# Patient Record
Sex: MALE | Race: WHITE | NOT HISPANIC OR LATINO | Employment: FULL TIME | URBAN - METROPOLITAN AREA
[De-identification: names, ages, dates, MRNs, and addresses within clinical notes are randomized per-mention and may not be internally consistent; named-entity substitution may affect disease eponyms.]

---

## 2017-09-05 DIAGNOSIS — I10 ESSENTIAL HYPERTENSION: ICD-10-CM

## 2017-09-05 RX ORDER — LISINOPRIL 20 MG/1
TABLET ORAL
Qty: 90 TABLET | Refills: 3 | OUTPATIENT
Start: 2017-09-05

## 2017-09-28 ENCOUNTER — OFFICE VISIT (OUTPATIENT)
Dept: FAMILY MEDICINE CLINIC | Facility: CLINIC | Age: 42
End: 2017-09-28

## 2017-09-28 VITALS
DIASTOLIC BLOOD PRESSURE: 80 MMHG | BODY MASS INDEX: 26.68 KG/M2 | HEART RATE: 58 BPM | TEMPERATURE: 97.2 F | RESPIRATION RATE: 16 BRPM | WEIGHT: 197 LBS | HEIGHT: 72 IN | SYSTOLIC BLOOD PRESSURE: 127 MMHG

## 2017-09-28 DIAGNOSIS — Z23 IMMUNIZATION DUE: ICD-10-CM

## 2017-09-28 DIAGNOSIS — I10 ESSENTIAL HYPERTENSION: Primary | ICD-10-CM

## 2017-09-28 DIAGNOSIS — Z13.6 SCREENING FOR ISCHEMIC HEART DISEASE: ICD-10-CM

## 2017-09-28 PROCEDURE — 90471 IMMUNIZATION ADMIN: CPT | Performed by: FAMILY MEDICINE

## 2017-09-28 PROCEDURE — 99213 OFFICE O/P EST LOW 20 MIN: CPT | Performed by: FAMILY MEDICINE

## 2017-09-28 PROCEDURE — 90686 IIV4 VACC NO PRSV 0.5 ML IM: CPT | Performed by: FAMILY MEDICINE

## 2017-09-28 RX ORDER — LISINOPRIL 20 MG/1
20 TABLET ORAL DAILY
Qty: 90 TABLET | Refills: 3 | Status: SHIPPED | OUTPATIENT
Start: 2017-09-28 | End: 2018-10-01 | Stop reason: SDUPTHER

## 2017-09-28 NOTE — PROGRESS NOTES
"Chief Complaint   Patient presents with   • Hypertension       Samm Conte presents to the office today to refill his medications. No medication side effects are reported.  BP is controlled    I have reviewed and updated his medications, medical history and problem list during today's office visit.        Social History   Substance Use Topics   • Smoking status: Never Smoker   • Smokeless tobacco: Never Used   • Alcohol use Yes       Review of Systems   Constitutional: Negative for fatigue.   Cardiovascular: Negative for chest pain.       Objective   /80  Pulse 58  Temp 97.2 °F (36.2 °C) (Oral)   Resp 16  Ht 72\" (182.9 cm)  Wt 197 lb (89.4 kg)  BMI 26.72 kg/m2  Body mass index is 26.72 kg/(m^2).  Physical Exam   Constitutional: He is cooperative. No distress.   Eyes: Conjunctivae and lids are normal.   Neck: Carotid bruit is not present. No tracheal deviation present.   Cardiovascular: Normal rate, regular rhythm and normal heart sounds.    No murmur heard.  Pulmonary/Chest: Effort normal and breath sounds normal.   Neurological: He is alert. He is not disoriented.   Skin: Skin is warm and dry.   Psychiatric: He has a normal mood and affect. His speech is normal and behavior is normal.   Vitals reviewed.      Data Reviewed:        Assessment/Plan     Problem List Items Addressed This Visit        Cardiovascular and Mediastinum    Essential hypertension - Primary    Relevant Medications    lisinopril (PRINIVIL,ZESTRIL) 20 MG tablet    Other Relevant Orders    Comprehensive metabolic panel    CBC and Differential    TSH      Other Visit Diagnoses     Screening for ischemic heart disease        Relevant Orders    Lipid Panel With LDL/HDL Ratio    Immunization due        Relevant Orders    Flu Vaccine Quad PF 3YR+          Outpatient Encounter Prescriptions as of 9/28/2017   Medication Sig Dispense Refill   • lisinopril (PRINIVIL,ZESTRIL) 20 MG tablet Take 1 tablet by mouth Daily. 90 tablet 3   • " [DISCONTINUED] lisinopril (PRINIVIL,ZESTRIL) 20 MG tablet Take 1 tablet by mouth daily. 90 tablet 3     No facility-administered encounter medications on file as of 9/28/2017.        Orders Placed This Encounter   Procedures   • Flu Vaccine Quad PF 3YR+   • Comprehensive metabolic panel     Standing Status:   Future     Standing Expiration Date:   11/2/2018   • Lipid Panel With LDL/HDL Ratio     Standing Status:   Future     Standing Expiration Date:   11/2/2018   • TSH     Standing Status:   Future     Standing Expiration Date:   11/2/2018   • CBC and Differential     Standing Status:   Future     Standing Expiration Date:   11/2/2018     Order Specific Question:   Manual Differential     Answer:   No       Continue with current treatment plan.         F/U in one year

## 2017-12-27 ENCOUNTER — OFFICE VISIT (OUTPATIENT)
Dept: FAMILY MEDICINE CLINIC | Facility: CLINIC | Age: 42
End: 2017-12-27

## 2017-12-27 VITALS
RESPIRATION RATE: 18 BRPM | DIASTOLIC BLOOD PRESSURE: 100 MMHG | OXYGEN SATURATION: 98 % | HEART RATE: 82 BPM | WEIGHT: 206 LBS | BODY MASS INDEX: 27.9 KG/M2 | HEIGHT: 72 IN | SYSTOLIC BLOOD PRESSURE: 144 MMHG | TEMPERATURE: 97.8 F

## 2017-12-27 DIAGNOSIS — J01.91 ACUTE RECURRENT SINUSITIS, UNSPECIFIED LOCATION: Primary | ICD-10-CM

## 2017-12-27 DIAGNOSIS — K21.9 GASTROESOPHAGEAL REFLUX DISEASE, ESOPHAGITIS PRESENCE NOT SPECIFIED: ICD-10-CM

## 2017-12-27 PROCEDURE — 99214 OFFICE O/P EST MOD 30 MIN: CPT | Performed by: NURSE PRACTITIONER

## 2017-12-27 RX ORDER — CEFDINIR 300 MG/1
600 CAPSULE ORAL DAILY
Qty: 20 CAPSULE | Refills: 0 | Status: SHIPPED | OUTPATIENT
Start: 2017-12-27 | End: 2018-10-01

## 2017-12-27 RX ORDER — FLUTICASONE PROPIONATE 50 MCG
2 SPRAY, SUSPENSION (ML) NASAL DAILY
Qty: 1 BOTTLE | Refills: 11 | Status: SHIPPED | OUTPATIENT
Start: 2017-12-27 | End: 2018-10-01

## 2017-12-27 RX ORDER — PREDNISONE 20 MG/1
20 TABLET ORAL 2 TIMES DAILY
Qty: 10 TABLET | Refills: 0 | Status: SHIPPED | OUTPATIENT
Start: 2017-12-27 | End: 2018-10-01

## 2017-12-27 RX ORDER — OMEPRAZOLE 40 MG/1
40 CAPSULE, DELAYED RELEASE ORAL DAILY
COMMUNITY
End: 2017-12-27 | Stop reason: SDUPTHER

## 2017-12-27 RX ORDER — OMEPRAZOLE 40 MG/1
40 CAPSULE, DELAYED RELEASE ORAL DAILY
Qty: 30 CAPSULE | Refills: 11 | Status: SHIPPED | OUTPATIENT
Start: 2017-12-27 | End: 2018-10-01 | Stop reason: SDUPTHER

## 2017-12-27 NOTE — PROGRESS NOTES
Subjective   Dg Harden is a 42 y.o. male.     History of Present Illness   Dg Harden 42 y.o. male who presents for evaluation of sinus pressure and congestion.  States symptoms originally started a couple of months ago and have been intermittent since.  He has had increased purulent nasal discharge and sinus pressure. He is also having increased post nasal drainage and sore throat within the past week. Seems to be worse after he travels. Just returned from [place]. Symptoms include ear pressure, congestion, sore throat, nasal blockage and post nasal drip.  Onset of symptoms was several days ago, unchanged since that time. Patient denies fever.   Evaluation to date: none   Treatment to date:  OTC oral decongestants and Advil.       Patient also reports some intermittent left chest pain that he notices mostly at night.  He has seen cardiology and had negative workup. He denies cough, shortness of breath of wheezing. He does have reflux and has had some increased symptoms recently. He takes OTC prilosec.   The following portions of the patient's history were reviewed and updated as appropriate: allergies, current medications, past family history, past medical history, past social history, past surgical history and problem list.    Review of Systems   Constitutional: Negative for chills and fever.   HENT: Positive for congestion, postnasal drip, sinus pain, sinus pressure and sore throat.    Respiratory: Negative for cough, chest tightness, shortness of breath and wheezing.        Objective   Physical Exam   Constitutional: He is oriented to person, place, and time. He appears well-developed and well-nourished.   HENT:   Right Ear: Tympanic membrane, external ear and ear canal normal.   Left Ear: Tympanic membrane, external ear and ear canal normal.   Nose: Right sinus exhibits no maxillary sinus tenderness and no frontal sinus tenderness. Left sinus exhibits no maxillary sinus tenderness and no frontal sinus  tenderness.   Mouth/Throat: Uvula is midline and oropharynx is clear and moist.   Cardiovascular: Normal rate and regular rhythm.    Pulmonary/Chest: Effort normal and breath sounds normal.   Neurological: He is oriented to person, place, and time.   Skin: Skin is warm and dry.   Psychiatric: He has a normal mood and affect. His behavior is normal. Judgment and thought content normal.   Nursing note and vitals reviewed.      Assessment/Plan   Dg was seen today for uri.    Diagnoses and all orders for this visit:    Acute recurrent sinusitis, unspecified location  -     fluticasone (FLONASE) 50 MCG/ACT nasal spray; 2 sprays into each nostril Daily.  -     cefdinir (OMNICEF) 300 MG capsule; Take 2 capsules by mouth Daily. For infection  -     predniSONE (DELTASONE) 20 MG tablet; Take 1 tablet by mouth 2 (Two) Times a Day.    Gastroesophageal reflux disease, esophagitis presence not specified  -     omeprazole (priLOSEC) 40 MG capsule; Take 1 capsule by mouth Daily.          Will increase prilosec dose to see if helps reflux. Patient to avoid eating late and elevated HOB.

## 2018-08-24 ENCOUNTER — RESULTS ENCOUNTER (OUTPATIENT)
Dept: FAMILY MEDICINE CLINIC | Facility: CLINIC | Age: 43
End: 2018-08-24

## 2018-08-24 DIAGNOSIS — I10 ESSENTIAL HYPERTENSION: ICD-10-CM

## 2018-08-24 DIAGNOSIS — Z13.6 SCREENING FOR ISCHEMIC HEART DISEASE: ICD-10-CM

## 2018-09-12 DIAGNOSIS — I10 ESSENTIAL HYPERTENSION: ICD-10-CM

## 2018-09-12 RX ORDER — LISINOPRIL 20 MG/1
TABLET ORAL
Qty: 90 TABLET | Refills: 3 | OUTPATIENT
Start: 2018-09-12

## 2018-10-01 ENCOUNTER — OFFICE VISIT (OUTPATIENT)
Dept: FAMILY MEDICINE CLINIC | Facility: CLINIC | Age: 43
End: 2018-10-01

## 2018-10-01 VITALS
RESPIRATION RATE: 16 BRPM | DIASTOLIC BLOOD PRESSURE: 86 MMHG | TEMPERATURE: 98.2 F | WEIGHT: 207 LBS | BODY MASS INDEX: 28.04 KG/M2 | HEIGHT: 72 IN | HEART RATE: 77 BPM | SYSTOLIC BLOOD PRESSURE: 127 MMHG

## 2018-10-01 DIAGNOSIS — I10 ESSENTIAL HYPERTENSION: Primary | ICD-10-CM

## 2018-10-01 DIAGNOSIS — K21.9 GASTROESOPHAGEAL REFLUX DISEASE WITHOUT ESOPHAGITIS: ICD-10-CM

## 2018-10-01 PROCEDURE — 99213 OFFICE O/P EST LOW 20 MIN: CPT | Performed by: FAMILY MEDICINE

## 2018-10-01 RX ORDER — OMEPRAZOLE 40 MG/1
40 CAPSULE, DELAYED RELEASE ORAL DAILY
Qty: 90 CAPSULE | Refills: 3 | Status: SHIPPED | OUTPATIENT
Start: 2018-10-01 | End: 2019-11-20 | Stop reason: SDUPTHER

## 2018-10-01 RX ORDER — LISINOPRIL 20 MG/1
20 TABLET ORAL DAILY
Qty: 90 TABLET | Refills: 3 | Status: SHIPPED | OUTPATIENT
Start: 2018-10-01 | End: 2019-09-15 | Stop reason: SDUPTHER

## 2018-10-01 NOTE — PROGRESS NOTES
"Chief Complaint   Patient presents with   • Hypertension       Subjective     Dg Harden presents to the office today to refill his medications. No medication side effects are reported.  This patient presents the office to refill medicines.  Blood pressure is controlled.  GERD symptoms are controlled.  Labs are due.  Overall, he feels well.    I have reviewed and updated his medications, medical history and problem list during today's office visit.     Patient Care Team:  Khoi Man MD as PCP - General (Family Medicine)    Social History   Substance Use Topics   • Smoking status: Never Smoker   • Smokeless tobacco: Never Used   • Alcohol use Yes     5 Cans of beer per week       Review of Systems   Constitutional: Negative for fatigue.   Cardiovascular: Negative for chest pain.       Objective     /86   Pulse 77   Temp 98.2 °F (36.8 °C) (Oral)   Resp 16   Ht 182.9 cm (72\")   Wt 93.9 kg (207 lb)   BMI 28.07 kg/m²     Body mass index is 28.07 kg/m².    Physical Exam   Constitutional: He is oriented to person, place, and time. He appears well-developed. No distress.   Eyes: Conjunctivae and lids are normal.   Neck: Carotid bruit is not present.   Cardiovascular: Normal rate, regular rhythm and normal heart sounds.    Pulmonary/Chest: Effort normal and breath sounds normal.   Neurological: He is alert and oriented to person, place, and time.   Skin: Skin is warm and dry.   Psychiatric: He has a normal mood and affect. His behavior is normal.   Vitals reviewed.      Data Reviewed:             Assessment/Plan     Problem List Items Addressed This Visit     Essential hypertension - Primary     Hypertension is unchanged.  Continue current treatment regimen.  Blood pressure will be reassessed at the next regular appointment.         Relevant Medications    lisinopril (PRINIVIL,ZESTRIL) 20 MG tablet    Gastroesophageal reflux disease without esophagitis     The current medical regimen is effective;  " continue present plan and medications.           Relevant Medications    omeprazole (priLOSEC) 40 MG capsule          No orders of the defined types were placed in this encounter.        Current Outpatient Prescriptions:   •  omeprazole (priLOSEC) 40 MG capsule, Take 1 capsule by mouth Daily., Disp: 90 capsule, Rfl: 3  •  lisinopril (PRINIVIL,ZESTRIL) 20 MG tablet, Take 1 tablet by mouth Daily., Disp: 90 tablet, Rfl: 3    Return in about 1 year (around 10/1/2019) for Recheck.

## 2019-09-15 DIAGNOSIS — I10 ESSENTIAL HYPERTENSION: ICD-10-CM

## 2019-09-16 RX ORDER — LISINOPRIL 20 MG/1
TABLET ORAL
Qty: 90 TABLET | Refills: 0 | Status: SHIPPED | OUTPATIENT
Start: 2019-09-16 | End: 2020-01-08 | Stop reason: SDUPTHER

## 2019-11-20 DIAGNOSIS — K21.9 GASTROESOPHAGEAL REFLUX DISEASE WITHOUT ESOPHAGITIS: ICD-10-CM

## 2019-11-20 RX ORDER — OMEPRAZOLE 40 MG/1
CAPSULE, DELAYED RELEASE ORAL
Qty: 30 CAPSULE | Refills: 0 | Status: SHIPPED | OUTPATIENT
Start: 2019-11-20 | End: 2020-01-08 | Stop reason: SDUPTHER

## 2019-12-14 DIAGNOSIS — I10 ESSENTIAL HYPERTENSION: ICD-10-CM

## 2019-12-16 RX ORDER — LISINOPRIL 20 MG/1
TABLET ORAL
Qty: 90 TABLET | Refills: 0 | OUTPATIENT
Start: 2019-12-16

## 2019-12-20 DIAGNOSIS — K21.9 GASTROESOPHAGEAL REFLUX DISEASE WITHOUT ESOPHAGITIS: ICD-10-CM

## 2019-12-20 RX ORDER — OMEPRAZOLE 40 MG/1
CAPSULE, DELAYED RELEASE ORAL
Qty: 30 CAPSULE | Refills: 0 | OUTPATIENT
Start: 2019-12-20

## 2020-01-08 ENCOUNTER — OFFICE VISIT (OUTPATIENT)
Dept: FAMILY MEDICINE CLINIC | Facility: CLINIC | Age: 45
End: 2020-01-08

## 2020-01-08 VITALS
HEIGHT: 74 IN | BODY MASS INDEX: 26.56 KG/M2 | SYSTOLIC BLOOD PRESSURE: 124 MMHG | OXYGEN SATURATION: 98 % | WEIGHT: 207 LBS | RESPIRATION RATE: 16 BRPM | DIASTOLIC BLOOD PRESSURE: 85 MMHG | TEMPERATURE: 98.4 F | HEART RATE: 73 BPM

## 2020-01-08 DIAGNOSIS — I10 ESSENTIAL HYPERTENSION: Primary | ICD-10-CM

## 2020-01-08 DIAGNOSIS — E66.3 OVERWEIGHT (BMI 25.0-29.9): ICD-10-CM

## 2020-01-08 DIAGNOSIS — K21.9 GASTROESOPHAGEAL REFLUX DISEASE WITHOUT ESOPHAGITIS: ICD-10-CM

## 2020-01-08 PROCEDURE — 99214 OFFICE O/P EST MOD 30 MIN: CPT | Performed by: FAMILY MEDICINE

## 2020-01-08 RX ORDER — OMEPRAZOLE 40 MG/1
40 CAPSULE, DELAYED RELEASE ORAL DAILY
Qty: 90 CAPSULE | Refills: 3 | Status: SHIPPED | OUTPATIENT
Start: 2020-01-08 | End: 2021-02-01 | Stop reason: SDUPTHER

## 2020-01-08 RX ORDER — LISINOPRIL 20 MG/1
20 TABLET ORAL DAILY
Qty: 90 TABLET | Refills: 3 | Status: SHIPPED | OUTPATIENT
Start: 2020-01-08 | End: 2021-02-01 | Stop reason: SDUPTHER

## 2020-01-08 NOTE — PROGRESS NOTES
"   Subjective       Chief Complaint   Patient presents with   • Hypertension     med refill    • Insomnia         History of Present Illness   Dg Harden is a 44 y.o. male who presents to the office today to refill medicines.  Labs are due.  Blood pressure is controlled.  GERD symptoms are stable on current therapy.  No side effects are reported.  He has had mild interval weight gain since last visit.  Overall he feels well. Sleep is an issue.    I have reviewed and updated his medications, medical history and problem list during today's office visit.     Social History     Tobacco Use   • Smoking status: Never Smoker   • Smokeless tobacco: Never Used   Substance Use Topics   • Alcohol use: Yes     Alcohol/week: 5.0 standard drinks     Types: 5 Cans of beer per week       Review of Systems   Constitutional: Negative for fatigue.   Cardiovascular: Negative for chest pain.         Physical Examination:   Objective   /85   Pulse 73   Temp 98.4 °F (36.9 °C) (Oral)   Resp 16   Ht 188 cm (74\")   Wt 93.9 kg (207 lb)   SpO2 98%   BMI 26.58 kg/m²     Body mass index is 26.58 kg/m².    Physical Exam   Constitutional: He is oriented to person, place, and time. He appears well-developed. No distress.   Eyes: Conjunctivae and lids are normal.   Neck: Carotid bruit is not present.   Cardiovascular: Normal rate, regular rhythm and normal heart sounds.   Pulmonary/Chest: Effort normal and breath sounds normal.   Neurological: He is alert and oriented to person, place, and time.   Skin: Skin is warm and dry.   Psychiatric: He has a normal mood and affect. His behavior is normal.   Vitals reviewed.       Data Reviewed:                      Assessment/Plan:   Assessment/Plan   Diagnoses and all orders for this visit:    1. Essential hypertension (Primary)  Assessment & Plan:  Hypertension is unchanged.  Continue current treatment regimen.  Blood pressure will be reassessed at the next regular " appointment.    Orders:  -     lisinopril (PRINIVIL,ZESTRIL) 20 MG tablet; Take 1 tablet by mouth Daily.  Dispense: 90 tablet; Refill: 3    2. Gastroesophageal reflux disease without esophagitis  Assessment & Plan:  The current medical regimen is effective;  continue present plan and medications.      Orders:  -     omeprazole (priLOSEC) 40 MG capsule; Take 1 capsule by mouth Daily.  Dispense: 90 capsule; Refill: 3    3. Overweight (BMI 25.0-29.9)  Assessment & Plan:  Obesity is worsening.  Discussed the patient's BMI.  The BMI is above average; BMI management plan is completed.  Diet interventions: moderate (500 kCal/d) deficit diet.      Patient Instructions         Follow up:   Return in about 1 year (around 1/8/2021) for Adult Wellness Visit, 30 minute visit, Recheck.

## 2020-12-20 DIAGNOSIS — I10 ESSENTIAL HYPERTENSION: ICD-10-CM

## 2020-12-20 DIAGNOSIS — K21.9 GASTROESOPHAGEAL REFLUX DISEASE WITHOUT ESOPHAGITIS: ICD-10-CM

## 2020-12-21 RX ORDER — OMEPRAZOLE 40 MG/1
CAPSULE, DELAYED RELEASE ORAL
Qty: 90 CAPSULE | Refills: 3 | OUTPATIENT
Start: 2020-12-21

## 2020-12-21 RX ORDER — LISINOPRIL 20 MG/1
TABLET ORAL
Qty: 90 TABLET | Refills: 3 | OUTPATIENT
Start: 2020-12-21

## 2021-01-21 ENCOUNTER — APPOINTMENT (OUTPATIENT)
Dept: CT IMAGING | Facility: HOSPITAL | Age: 46
End: 2021-01-21

## 2021-01-21 ENCOUNTER — HOSPITAL ENCOUNTER (OUTPATIENT)
Facility: HOSPITAL | Age: 46
Setting detail: OBSERVATION
Discharge: HOME | End: 2021-01-22
Attending: EMERGENCY MEDICINE | Admitting: HOSPITALIST

## 2021-01-21 DIAGNOSIS — K52.9 ENTERITIS: Primary | ICD-10-CM

## 2021-01-21 DIAGNOSIS — R19.5 HEME POSITIVE STOOL: ICD-10-CM

## 2021-01-21 LAB
ADV 40+41 DNA STL QL NAA+NON-PROBE: NOT DETECTED
ALBUMIN SERPL-MCNC: 4.7 G/DL (ref 3.5–5.2)
ALBUMIN/GLOB SERPL: 1.8 G/DL
ALP SERPL-CCNC: 60 U/L (ref 39–117)
ALT SERPL W P-5'-P-CCNC: 35 U/L (ref 1–41)
ANION GAP SERPL CALCULATED.3IONS-SCNC: 12 MMOL/L (ref 5–15)
APTT PPP: 24.4 SECONDS (ref 22.7–35.4)
AST SERPL-CCNC: 31 U/L (ref 1–40)
ASTRO TYP 1-8 RNA STL QL NAA+NON-PROBE: NOT DETECTED
BASOPHILS # BLD AUTO: 0.02 10*3/MM3 (ref 0–0.2)
BASOPHILS NFR BLD AUTO: 0.2 % (ref 0–1.5)
BILIRUB SERPL-MCNC: 0.5 MG/DL (ref 0–1.2)
BUN SERPL-MCNC: 15 MG/DL (ref 6–20)
BUN/CREAT SERPL: 13.5 (ref 7–25)
C CAYETANENSIS DNA STL QL NAA+NON-PROBE: NOT DETECTED
CALCIUM SPEC-SCNC: 9.5 MG/DL (ref 8.6–10.5)
CAMPY SP DNA.DIARRHEA STL QL NAA+PROBE: NOT DETECTED
CHLORIDE SERPL-SCNC: 101 MMOL/L (ref 98–107)
CO2 SERPL-SCNC: 25 MMOL/L (ref 22–29)
CREAT SERPL-MCNC: 1.11 MG/DL (ref 0.76–1.27)
CRYPTOSP STL CULT: NOT DETECTED
DEPRECATED RDW RBC AUTO: 39.2 FL (ref 37–54)
E HISTOLYT AG STL-ACNC: NOT DETECTED
EAEC PAA PLAS AGGR+AATA ST NAA+NON-PRB: NOT DETECTED
EC STX1 + STX2 GENES STL NAA+PROBE: NOT DETECTED
EOSINOPHIL # BLD AUTO: 0.07 10*3/MM3 (ref 0–0.4)
EOSINOPHIL NFR BLD AUTO: 0.8 % (ref 0.3–6.2)
EPEC EAE GENE STL QL NAA+NON-PROBE: NOT DETECTED
ERYTHROCYTE [DISTWIDTH] IN BLOOD BY AUTOMATED COUNT: 12.9 % (ref 12.3–15.4)
ETEC LTA+ST1A+ST1B TOX ST NAA+NON-PROBE: NOT DETECTED
G LAMBLIA DNA SPEC QL NAA+PROBE: NOT DETECTED
GFR SERPL CREATININE-BSD FRML MDRD: 71 ML/MIN/1.73
GLOBULIN UR ELPH-MCNC: 2.6 GM/DL
GLUCOSE SERPL-MCNC: 106 MG/DL (ref 65–99)
HCT VFR BLD AUTO: 48.3 % (ref 37.5–51)
HGB BLD-MCNC: 15.8 G/DL (ref 13–17.7)
HOLD SPECIMEN: NORMAL
HOLD SPECIMEN: NORMAL
IMM GRANULOCYTES # BLD AUTO: 0.03 10*3/MM3 (ref 0–0.05)
IMM GRANULOCYTES NFR BLD AUTO: 0.3 % (ref 0–0.5)
INR PPP: 1.03 (ref 0.9–1.1)
LIPASE SERPL-CCNC: 25 U/L (ref 13–60)
LYMPHOCYTES # BLD AUTO: 1.3 10*3/MM3 (ref 0.7–3.1)
LYMPHOCYTES NFR BLD AUTO: 14.6 % (ref 19.6–45.3)
MCH RBC QN AUTO: 27.6 PG (ref 26.6–33)
MCHC RBC AUTO-ENTMCNC: 32.7 G/DL (ref 31.5–35.7)
MCV RBC AUTO: 84.4 FL (ref 79–97)
MONOCYTES # BLD AUTO: 0.57 10*3/MM3 (ref 0.1–0.9)
MONOCYTES NFR BLD AUTO: 6.4 % (ref 5–12)
NEUTROPHILS NFR BLD AUTO: 6.93 10*3/MM3 (ref 1.7–7)
NEUTROPHILS NFR BLD AUTO: 77.7 % (ref 42.7–76)
NOROVIRUS GI+II RNA STL QL NAA+NON-PROBE: NOT DETECTED
NRBC BLD AUTO-RTO: 0 /100 WBC (ref 0–0.2)
P SHIGELLOIDES DNA STL QL NAA+PROBE: NOT DETECTED
PLATELET # BLD AUTO: 273 10*3/MM3 (ref 140–450)
PMV BLD AUTO: 9.5 FL (ref 6–12)
POTASSIUM SERPL-SCNC: 3.8 MMOL/L (ref 3.5–5.2)
PROT SERPL-MCNC: 7.3 G/DL (ref 6–8.5)
PROTHROMBIN TIME: 13.3 SECONDS (ref 11.7–14.2)
RBC # BLD AUTO: 5.72 10*6/MM3 (ref 4.14–5.8)
RV RNA STL NAA+PROBE: NOT DETECTED
SALMONELLA DNA SPEC QL NAA+PROBE: NOT DETECTED
SAPO I+II+IV+V RNA STL QL NAA+NON-PROBE: NOT DETECTED
SARS-COV-2 RNA RESP QL NAA+PROBE: NOT DETECTED
SHIGELLA SP+EIEC IPAH STL QL NAA+PROBE: NOT DETECTED
SODIUM SERPL-SCNC: 138 MMOL/L (ref 136–145)
V CHOLERAE DNA SPEC QL NAA+PROBE: NOT DETECTED
VIBRIO DNA SPEC NAA+PROBE: NOT DETECTED
WBC # BLD AUTO: 8.92 10*3/MM3 (ref 3.4–10.8)
WHOLE BLOOD HOLD SPECIMEN: NORMAL
YERSINIA STL CULT: NOT DETECTED

## 2021-01-21 PROCEDURE — G0378 HOSPITAL OBSERVATION PER HR: HCPCS

## 2021-01-21 PROCEDURE — 96376 TX/PRO/DX INJ SAME DRUG ADON: CPT

## 2021-01-21 PROCEDURE — 85025 COMPLETE CBC W/AUTO DIFF WBC: CPT | Performed by: EMERGENCY MEDICINE

## 2021-01-21 PROCEDURE — U0003 INFECTIOUS AGENT DETECTION BY NUCLEIC ACID (DNA OR RNA); SEVERE ACUTE RESPIRATORY SYNDROME CORONAVIRUS 2 (SARS-COV-2) (CORONAVIRUS DISEASE [COVID-19]), AMPLIFIED PROBE TECHNIQUE, MAKING USE OF HIGH THROUGHPUT TECHNOLOGIES AS DESCRIBED BY CMS-2020-01-R: HCPCS | Performed by: EMERGENCY MEDICINE

## 2021-01-21 PROCEDURE — 96361 HYDRATE IV INFUSION ADD-ON: CPT

## 2021-01-21 PROCEDURE — 96374 THER/PROPH/DIAG INJ IV PUSH: CPT

## 2021-01-21 PROCEDURE — 85018 HEMOGLOBIN: CPT | Performed by: NURSE PRACTITIONER

## 2021-01-21 PROCEDURE — 83690 ASSAY OF LIPASE: CPT | Performed by: EMERGENCY MEDICINE

## 2021-01-21 PROCEDURE — 85610 PROTHROMBIN TIME: CPT | Performed by: EMERGENCY MEDICINE

## 2021-01-21 PROCEDURE — 99284 EMERGENCY DEPT VISIT MOD MDM: CPT

## 2021-01-21 PROCEDURE — 74177 CT ABD & PELVIS W/CONTRAST: CPT

## 2021-01-21 PROCEDURE — 85730 THROMBOPLASTIN TIME PARTIAL: CPT | Performed by: EMERGENCY MEDICINE

## 2021-01-21 PROCEDURE — C9803 HOPD COVID-19 SPEC COLLECT: HCPCS

## 2021-01-21 PROCEDURE — 80053 COMPREHEN METABOLIC PANEL: CPT | Performed by: EMERGENCY MEDICINE

## 2021-01-21 PROCEDURE — 85014 HEMATOCRIT: CPT | Performed by: NURSE PRACTITIONER

## 2021-01-21 PROCEDURE — 0097U HC BIOFIRE FILMARRAY GI PANEL: CPT | Performed by: EMERGENCY MEDICINE

## 2021-01-21 PROCEDURE — 25010000002 IOPAMIDOL 61 % SOLUTION: Performed by: EMERGENCY MEDICINE

## 2021-01-21 PROCEDURE — 36415 COLL VENOUS BLD VENIPUNCTURE: CPT

## 2021-01-21 RX ORDER — SODIUM CHLORIDE 0.9 % (FLUSH) 0.9 %
10 SYRINGE (ML) INJECTION AS NEEDED
Status: DISCONTINUED | OUTPATIENT
Start: 2021-01-21 | End: 2021-01-22 | Stop reason: HOSPADM

## 2021-01-21 RX ORDER — PANTOPRAZOLE SODIUM 40 MG/10ML
40 INJECTION, POWDER, LYOPHILIZED, FOR SOLUTION INTRAVENOUS
Status: DISCONTINUED | OUTPATIENT
Start: 2021-01-21 | End: 2021-01-22 | Stop reason: HOSPADM

## 2021-01-21 RX ORDER — LISINOPRIL 20 MG/1
20 TABLET ORAL DAILY
Status: DISCONTINUED | OUTPATIENT
Start: 2021-01-21 | End: 2021-01-22 | Stop reason: HOSPADM

## 2021-01-21 RX ORDER — ACETAMINOPHEN 325 MG/1
650 TABLET ORAL EVERY 4 HOURS PRN
Status: DISCONTINUED | OUTPATIENT
Start: 2021-01-21 | End: 2021-01-22 | Stop reason: HOSPADM

## 2021-01-21 RX ORDER — SODIUM CHLORIDE 0.9 % (FLUSH) 0.9 %
10 SYRINGE (ML) INJECTION EVERY 12 HOURS SCHEDULED
Status: DISCONTINUED | OUTPATIENT
Start: 2021-01-21 | End: 2021-01-22 | Stop reason: HOSPADM

## 2021-01-21 RX ORDER — ONDANSETRON 2 MG/ML
4 INJECTION INTRAMUSCULAR; INTRAVENOUS EVERY 6 HOURS PRN
Status: DISCONTINUED | OUTPATIENT
Start: 2021-01-21 | End: 2021-01-22 | Stop reason: HOSPADM

## 2021-01-21 RX ORDER — ACETAMINOPHEN 650 MG/1
650 SUPPOSITORY RECTAL EVERY 4 HOURS PRN
Status: DISCONTINUED | OUTPATIENT
Start: 2021-01-21 | End: 2021-01-21

## 2021-01-21 RX ORDER — SODIUM CHLORIDE 9 MG/ML
125 INJECTION, SOLUTION INTRAVENOUS CONTINUOUS
Status: DISCONTINUED | OUTPATIENT
Start: 2021-01-21 | End: 2021-01-22 | Stop reason: HOSPADM

## 2021-01-21 RX ORDER — PANTOPRAZOLE SODIUM 40 MG/10ML
80 INJECTION, POWDER, LYOPHILIZED, FOR SOLUTION INTRAVENOUS ONCE
Status: COMPLETED | OUTPATIENT
Start: 2021-01-21 | End: 2021-01-21

## 2021-01-21 RX ORDER — ACETAMINOPHEN 160 MG/5ML
650 SOLUTION ORAL EVERY 4 HOURS PRN
Status: DISCONTINUED | OUTPATIENT
Start: 2021-01-21 | End: 2021-01-21

## 2021-01-21 RX ORDER — SODIUM CHLORIDE 0.9 % (FLUSH) 0.9 %
10 SYRINGE (ML) INJECTION AS NEEDED
Status: DISCONTINUED | OUTPATIENT
Start: 2021-01-21 | End: 2021-01-21

## 2021-01-21 RX ADMIN — SODIUM CHLORIDE 1000 ML: 9 INJECTION, SOLUTION INTRAVENOUS at 10:00

## 2021-01-21 RX ADMIN — SODIUM CHLORIDE 125 ML/HR: 9 INJECTION, SOLUTION INTRAVENOUS at 21:25

## 2021-01-21 RX ADMIN — PANTOPRAZOLE SODIUM 40 MG: 40 INJECTION, POWDER, FOR SOLUTION INTRAVENOUS at 16:35

## 2021-01-21 RX ADMIN — SODIUM CHLORIDE, PRESERVATIVE FREE 10 ML: 5 INJECTION INTRAVENOUS at 21:25

## 2021-01-21 RX ADMIN — SODIUM CHLORIDE 125 ML/HR: 9 INJECTION, SOLUTION INTRAVENOUS at 14:31

## 2021-01-21 RX ADMIN — IOPAMIDOL 85 ML: 612 INJECTION, SOLUTION INTRAVENOUS at 11:27

## 2021-01-21 RX ADMIN — PANTOPRAZOLE SODIUM 80 MG: 40 INJECTION, POWDER, FOR SOLUTION INTRAVENOUS at 10:07

## 2021-01-21 NOTE — PLAN OF CARE
Goal Outcome Evaluation:  Plan of Care Reviewed With: patient  Progress: no change  Outcome Summary: New admit from ER today. Full liquid diet but can advance as tolerated. IVF infusing. Stool sent to lab for testing. No c/o n/v/p. Ad bryson. VSS. Will continue to monitor.

## 2021-01-21 NOTE — H&P
Patient Name:  Dg Harden  YOB: 1975  MRN:  [id number]  Admit Date:  1/21/2021  Patient Care Team:  Khoi Man MD as PCP - General (Family Medicine)      Subjective   History Present Illness     Chief Complaint   Patient presents with   • Black or Bloody Stool   • Abdominal Pain       Mr. Harden is a 45 y.o. {with a history of GERD and HTN that presents to [facility] complaining of abdominal pain, diarrhea, and nausea onset Tuesday.  He describes his stool as watery and black but no obvious blood.  He did take Pepto-Bismol yesterday without relief but he was having black stools prior to taking the medication.  He still has periumbilical abdominal pain described as moderate.  He denies nausea or vomiting presently.  He is having a loose stool approximately every 2-3 hours.  His appetite and intake has been adequate.  No known exacerbating or alleviating factors.  He flew to [facility] last week for work and was exposed to people not wearing a mask.  He denies fever, chills, cough, congestion, shortness of breath.  He denies known exposure to contaminated food or water, antibiotics in the last 3 months, or known ill individuals.  He has a history of GERD well-controlled on PPI with no prior history of GI bleeding or peptic ulcer disease.  He does not drink regularly or use NSAIDs/OACs.  No prior upper or lower endoscopy.         History of Present Illness  Review of Systems   Constitutional: Negative for appetite change, chills, diaphoresis, fatigue and fever.   HENT: Negative for trouble swallowing.    Eyes: Negative for visual disturbance.   Respiratory: Negative for cough, choking, chest tightness, shortness of breath and stridor.    Cardiovascular: Negative for chest pain, palpitations and leg swelling.   Gastrointestinal: Positive for abdominal distention, abdominal pain, diarrhea and nausea. Negative for anal bleeding, blood in stool, constipation and vomiting.         No hematemesis/ coffee-ground emesis, no hematochezia   Endocrine: Negative for polydipsia, polyphagia and polyuria.   Genitourinary: Negative for dysuria.   Musculoskeletal: Negative for back pain.   Skin: Negative for pallor.   Neurological: Negative for dizziness, weakness and light-headedness.   Hematological: Does not bruise/bleed easily.   Psychiatric/Behavioral: Negative for confusion.        Personal History     Past Medical History:   Diagnosis Date   • Hyperlipidemia    • Hypertension      Past Surgical History:   Procedure Laterality Date   • SEPTOPLASTY  Feb 2009   • VASECTOMY       Family History   Problem Relation Age of Onset   • Hypertension Father    • Hyperlipidemia Father    • Stroke Maternal Grandmother    • Heart disease Paternal Grandfather      Social History     Tobacco Use   • Smoking status: Never Smoker   • Smokeless tobacco: Never Used   Substance Use Topics   • Alcohol use: Yes     Alcohol/week: 5.0 standard drinks     Types: 5 Cans of beer per week   • Drug use: No     No current facility-administered medications on file prior to encounter.      Current Outpatient Medications on File Prior to Encounter   Medication Sig Dispense Refill   • lisinopril (PRINIVIL,ZESTRIL) 20 MG tablet Take 1 tablet by mouth Daily. 90 tablet 3   • omeprazole (priLOSEC) 40 MG capsule Take 1 capsule by mouth Daily. 90 capsule 3     No Known Allergies    Objective    Objective     Vital Signs  Temp:  [97 °F (36.1 °C)] 97 °F (36.1 °C)  Heart Rate:  [] 84  Resp:  [18] 18  BP: ()/(60-77) 105/77  SpO2:  [98 %-99 %] 98 %  on   ;   Device (Oxygen Therapy): room air  Body mass index is 27.12 kg/m².    Physical Exam  Vitals signs and nursing note reviewed.   Constitutional:       General: He is not in acute distress.     Appearance: He is well-developed. He is not toxic-appearing or diaphoretic.      Comments: Mild ill appearance   HENT:      Head: Normocephalic and atraumatic.      Mouth/Throat:       Mouth: Mucous membranes are moist.   Eyes:      Extraocular Movements: Extraocular movements intact.      Conjunctiva/sclera: Conjunctivae normal.   Neck:      Musculoskeletal: Normal range of motion.      Trachea: No tracheal deviation.   Cardiovascular:      Rate and Rhythm: Normal rate and regular rhythm.      Pulses: Normal pulses.      Heart sounds: No murmur.   Pulmonary:      Effort: Pulmonary effort is normal. No respiratory distress.      Breath sounds: Normal breath sounds. No wheezing, rhonchi or rales.   Abdominal:      General: Bowel sounds are increased. There is no distension.      Palpations: Abdomen is soft. There is no mass.      Tenderness: There is abdominal tenderness in the epigastric area and periumbilical area. There is no guarding or rebound.      Comments: Mild abdominal distention   Musculoskeletal: Normal range of motion.   Skin:     General: Skin is warm and dry.   Neurological:      General: No focal deficit present.      Mental Status: He is alert and oriented to person, place, and time.   Psychiatric:         Judgment: Judgment normal.         Results Review:  I reviewed the patient's new clinical results.  I reviewed the patient's new imaging results and agree with the interpretation.  I reviewed the patient's other test results and agree with the interpretation  I personally viewed and interpreted the patient's EKG/Telemetry data  Discussed with ED provider.    Lab Results (last 24 hours)     Procedure Component Value Units Date/Time    POCT FECAL OCCULT BLOOD BY IMMUNOASSAY [[id number]]  (Abnormal) Collected: 01/21/21 0913    Specimen: Stool Updated: 01/21/21 0939     Fecal Occult Blood Positive     QC Yes     Lot Number [id number]     Comment: 08/28/21       CBC & Differential [[id number]]  (Abnormal) Collected: 01/21/21 0949    Specimen: Blood Updated: 01/21/21 1009    Narrative:      The following orders were created for panel order CBC & Differential.  Procedure                                Abnormality         Status                     ---------                               -----------         ------                     CBC Auto Differential[[id number]]        Abnormal            Final result                 Please view results for these tests on the individual orders.    Comprehensive Metabolic Panel [[id number]]  (Abnormal) Collected: 01/21/21 0949    Specimen: Blood Updated: 01/21/21 1028     Glucose 106 mg/dL      BUN 15 mg/dL      Creatinine 1.11 mg/dL      Sodium 138 mmol/L      Potassium 3.8 mmol/L      Chloride 101 mmol/L      CO2 25.0 mmol/L      Calcium 9.5 mg/dL      Total Protein 7.3 g/dL      Albumin 4.70 g/dL      ALT (SGPT) 35 U/L      AST (SGOT) 31 U/L      Alkaline Phosphatase 60 U/L      Total Bilirubin 0.5 mg/dL      eGFR Non African Amer 71 mL/min/1.73      Globulin 2.6 gm/dL      A/G Ratio 1.8 g/dL      BUN/Creatinine Ratio 13.5     Anion Gap 12.0 mmol/L     Narrative:      GFR Normal >60  Chronic Kidney Disease <60  Kidney Failure <15      CBC Auto Differential [[id number]]  (Abnormal) Collected: 01/21/21 0949    Specimen: Blood Updated: 01/21/21 1009     WBC 8.92 10*3/mm3      RBC 5.72 10*6/mm3      Hemoglobin 15.8 g/dL      Hematocrit 48.3 %      MCV 84.4 fL      MCH 27.6 pg      MCHC 32.7 g/dL      RDW 12.9 %      RDW-SD 39.2 fl      MPV 9.5 fL      Platelets 273 10*3/mm3      Neutrophil % 77.7 %      Lymphocyte % 14.6 %      Monocyte % 6.4 %      Eosinophil % 0.8 %      Basophil % 0.2 %      Immature Grans % 0.3 %      Neutrophils, Absolute 6.93 10*3/mm3      Lymphocytes, Absolute 1.30 10*3/mm3      Monocytes, Absolute 0.57 10*3/mm3      Eosinophils, Absolute 0.07 10*3/mm3      Basophils, Absolute 0.02 10*3/mm3      Immature Grans, Absolute 0.03 10*3/mm3      nRBC 0.0 /100 WBC     Lipase [[id number]]  (Normal) Collected: 01/21/21 0949    Specimen: Blood Updated: 01/21/21 1028     Lipase 25 U/L     aPTT [[id number]]  (Normal) Collected: 01/21/21 0950    Specimen: Blood  Updated: 01/21/21 1022     PTT 24.4 seconds     Protime-INR [[id number]]  (Normal) Collected: 01/21/21 0950    Specimen: Blood Updated: 01/21/21 1022     Protime 13.3 Seconds      INR 1.03          Imaging Results (Last 24 Hours)     Procedure Component Value Units Date/Time    CT Abdomen Pelvis With Contrast [[id number]] Resulted: 01/21/21 1126     Updated: 01/21/21 1128               No orders to display        Assessment/Plan     Active Hospital Problems    Diagnosis  POA   • **Enteritis [K52.9]  Yes   • Heme positive stool [R19.5]  Unknown   • Overweight (BMI 25.0-29.9) [E66.3]  Yes   • Gastroesophageal reflux disease without esophagitis [K21.9]  Yes   • Essential hypertension [I10]  Yes      Resolved Hospital Problems   No resolved problems to display.       Mr. Harden is a 45 y.o. with a history of GERD but no prior history of PUD or GIB that presents with nausea, diarrhea, abdominal pain, and black watery stools onset 2 days ago.  Patient is heme positive but hemoglobin is 15. He is likely dehydrated given persistent diarrhea.  CT abdomen pelvis with fairly significant enteritis prompting patient to be admitted for observation and supportive care.      · Continue supportive care with IV fluids, antiemetics  · Full liquid diet advance as tolerated   · Check GI PCR panel and COVID-19 test given recent travel to [place] via airline.    · Hold on antibiotics for now.   · Low suspicion for GI bleed given stable hgb, normal BUN and lack of associated symptoms. Continue Protonix IV twice daily and monitor hemoglobin/ stool appearance    · No indication for GI consult at this time. He will need outpatient referral to GI for endoscopy screening    · Continue home antihypertensive agent.     · I discussed the patient's findings and my recommendations with patient, nursing staff and ED provider.     VTE Prophylaxis - SCDs.  Code Status - Full code.       CELSO Choi  [facility]  01/21/21  12:57 EST

## 2021-01-21 NOTE — ED TRIAGE NOTES
Pt to ER via [facility]. Pt states blood in stool with generalized abdominal pain that started this past Tuesday    Patient in mask. This RN in appropriate PPE - including mask, goggles, and gloves during all of patient care.

## 2021-01-21 NOTE — ED PROVIDER NOTES
EMERGENCY DEPARTMENT ENCOUNTER    Room Number:  34/34  Date seen:  1/21/2021  PCP: Khoi Man MD  Historian: Patient      HPI:  Chief Complaint: Black stool, abdominal pain  A complete HPI/ROS/PMH/PSH/SH/FH are unobtainable due to: Nothing  Context: Dg Harden is a 45 y.o. male who presents to the ED c/o black stool that is watery.  Symptoms began on Tuesday.  He reports that he has taken some Pepto-Bismol for symptoms but he noted the black stool prior to that.  He was seen in urgent care today and had a rectal exam performed that demonstrated guaiac positive stool.  He reports a history of GERD for which he takes omeprazole daily.  He denies history of previous EGD or colonoscopy.  He has had no nausea, vomiting, hematemesis.  He reports several loose stools a day since Tuesday, at least 3-4 last night alone.  He denies fever, chills, cough, shortness of air, change in taste or smell.  No bright red blood per rectum.  He has associated mild diffuse abdominal pain.  He drinks alcohol socially.  Non-smoker.  He denies frequent NSAID use.  He takes no blood thinner medications.            PAST MEDICAL HISTORY  Active Ambulatory Problems     Diagnosis Date Noted   • Essential hypertension 09/21/2016   • Gastroesophageal reflux disease without esophagitis 10/01/2018   • Overweight (BMI 25.0-29.9) 01/08/2020     Resolved Ambulatory Problems     Diagnosis Date Noted   • No Resolved Ambulatory Problems     Past Medical History:   Diagnosis Date   • Hyperlipidemia    • Hypertension          PAST SURGICAL HISTORY  Past Surgical History:   Procedure Laterality Date   • SEPTOPLASTY  Feb 2009   • VASECTOMY           FAMILY HISTORY  Family History   Problem Relation Age of Onset   • Hypertension Father    • Hyperlipidemia Father    • Stroke Maternal Grandmother    • Heart disease Paternal Grandfather          SOCIAL HISTORY  Social History     Socioeconomic History   • Marital status:      Spouse name: Not on  file   • Number of children: Not on file   • Years of education: Not on file   • Highest education level: Not on file   Tobacco Use   • Smoking status: Never Smoker   • Smokeless tobacco: Never Used   Substance and Sexual Activity   • Alcohol use: Yes     Alcohol/week: 5.0 standard drinks     Types: 5 Cans of beer per week   • Drug use: No   • Sexual activity: Yes     Partners: Female     Birth control/protection: Surgical         ALLERGIES  Patient has no known allergies.        REVIEW OF SYSTEMS  Review of Systems   Review of all 14 systems is negative other than stated in the HPI above.      PHYSICAL EXAM  ED Triage Vitals [01/21/21 0940]   Temp Heart Rate Resp BP SpO2   97 °F (36.1 °C) 110 18 -- 99 %      Temp src Heart Rate Source Patient Position BP Location FiO2 (%)   -- -- -- -- --         GENERAL: Awake and alert, no acute distress  HENT: nares patent  EYES: no scleral icterus, EOMI  CV: regular rhythm, normal rate  RESPIRATORY: normal effort, lungs clear to auscultation bilaterally  ABDOMEN: soft, mild diffuse tenderness without rebound or guarding.  Rectal exam deferred as this had been performed at urgent care just prior to arrival.  MUSCULOSKELETAL: no deformity  NEURO: alert, moves all extremities, follows commands  PSYCH:  calm, cooperative  SKIN: warm, dry    Vital signs and nursing notes reviewed.          LAB RESULTS  Recent Results (from the past 24 hour(s))   POCT FECAL OCCULT BLOOD BY IMMUNOASSAY    Collection Time: 01/21/21  9:13 AM    Specimen: Stool    Specimen type and source: Stool, Stool   Result Value Ref Range    Fecal Occult Blood Positive (A) Negative    QC Yes     Lot Number [id number]    Comprehensive Metabolic Panel    Collection Time: 01/21/21  9:49 AM    Specimen: Blood   Result Value Ref Range    Glucose 106 (H) 65 - 99 mg/dL    BUN 15 6 - 20 mg/dL    Creatinine 1.11 0.76 - 1.27 mg/dL    Sodium 138 136 - 145 mmol/L    Potassium 3.8 3.5 - 5.2 mmol/L    Chloride 101 98 - 107 mmol/L     CO2 25.0 22.0 - 29.0 mmol/L    Calcium 9.5 8.6 - 10.5 mg/dL    Total Protein 7.3 6.0 - 8.5 g/dL    Albumin 4.70 3.50 - 5.20 g/dL    ALT (SGPT) 35 1 - 41 U/L    AST (SGOT) 31 1 - 40 U/L    Alkaline Phosphatase 60 39 - 117 U/L    Total Bilirubin 0.5 0.0 - 1.2 mg/dL    eGFR Non African Amer 71 >60 mL/min/1.73    Globulin 2.6 gm/dL    A/G Ratio 1.8 g/dL    BUN/Creatinine Ratio 13.5 7.0 - 25.0    Anion Gap 12.0 5.0 - 15.0 mmol/L   Green Top (Gel)    Collection Time: 01/21/21  9:49 AM   Result Value Ref Range    Extra Tube Hold for add-ons.    Lavender Top    Collection Time: 01/21/21  9:49 AM   Result Value Ref Range    Extra Tube hold for add-on    Gold Top - SST    Collection Time: 01/21/21  9:49 AM   Result Value Ref Range    Extra Tube Hold for add-ons.    CBC Auto Differential    Collection Time: 01/21/21  9:49 AM    Specimen: Blood   Result Value Ref Range    WBC 8.92 3.40 - 10.80 10*3/mm3    RBC 5.72 4.14 - 5.80 10*6/mm3    Hemoglobin 15.8 13.0 - 17.7 g/dL    Hematocrit 48.3 37.5 - 51.0 %    MCV 84.4 79.0 - 97.0 fL    MCH 27.6 26.6 - 33.0 pg    MCHC 32.7 31.5 - 35.7 g/dL    RDW 12.9 12.3 - 15.4 %    RDW-SD 39.2 37.0 - 54.0 fl    MPV 9.5 6.0 - 12.0 fL    Platelets 273 140 - 450 10*3/mm3    Neutrophil % 77.7 (H) 42.7 - 76.0 %    Lymphocyte % 14.6 (L) 19.6 - 45.3 %    Monocyte % 6.4 5.0 - 12.0 %    Eosinophil % 0.8 0.3 - 6.2 %    Basophil % 0.2 0.0 - 1.5 %    Immature Grans % 0.3 0.0 - 0.5 %    Neutrophils, Absolute 6.93 1.70 - 7.00 10*3/mm3    Lymphocytes, Absolute 1.30 0.70 - 3.10 10*3/mm3    Monocytes, Absolute 0.57 0.10 - 0.90 10*3/mm3    Eosinophils, Absolute 0.07 0.00 - 0.40 10*3/mm3    Basophils, Absolute 0.02 0.00 - 0.20 10*3/mm3    Immature Grans, Absolute 0.03 0.00 - 0.05 10*3/mm3    nRBC 0.0 0.0 - 0.2 /100 WBC   Lipase    Collection Time: 01/21/21  9:49 AM    Specimen: Blood   Result Value Ref Range    Lipase 25 13 - 60 U/L   aPTT    Collection Time: 01/21/21  9:50 AM    Specimen: Blood   Result Value Ref  Range    PTT 24.4 22.7 - 35.4 seconds   Protime-INR    Collection Time: 01/21/21  9:50 AM    Specimen: Blood   Result Value Ref Range    Protime 13.3 11.7 - 14.2 Seconds    INR 1.03 0.90 - 1.10       Ordered the above labs and reviewed the results.        RADIOLOGY  Ct Abdomen Pelvis With Contrast    Result Date: 1/21/2021  CT ABDOMEN AND PELVIS WITH CONTRAST  HISTORY: Abdominal pain and diarrhea.  TECHNIQUE: Axial CT images of the abdomen and pelvis were obtained following administration of intravenous contrast. The patient was not given oral contrast Coronal and sagittal reformats were obtained.  COMPARISON: 06/06/2011  FINDINGS: The mid and distal small bowel loops demonstrate diffuse wall thickening with submucosal edema and mild mucosal hyperenhancement. There is adjacent mesenteric stranding and fluid present. The distal/terminal ileum is, however, unremarkable. The appendix is normal. The colon is fluid-filled. There is no pneumatosis present. Small amount of layering fluid is seen within the pelvis. No evidence of bowel obstruction.  The liver, gallbladder, spleen and the pancreas is normal. Bilateral adrenal glands and kidneys are unremarkable. No renal calculi or hydronephrosis. The urinary bladder is partially distended and normal.      There is pretty significant wall thickening involving the mid to distal small bowel loops with adjacent mesenteric stranding and fluid present. The colon remains fluid-filled. The findings are suggestive of a significant enteritis.  These findings were discussed with Dr. Escalera by telephone.  Radiation dose reduction techniques were utilized, including automated exposure control and exposure modulation based on body size.         Ordered the above noted radiological studies. Reviewed by me in PACS.            PROCEDURES  Procedures              MEDICATIONS GIVEN IN ER  Medications   sodium chloride 0.9 % flush 10 mL (has no administration in time range)   sodium chloride  0.9 % flush 10 mL (has no administration in time range)   sodium chloride 0.9 % infusion (has no administration in time range)   ondansetron (ZOFRAN) injection 4 mg (has no administration in time range)   sodium chloride 0.9 % flush 10 mL (has no administration in time range)   sodium chloride 0.9 % flush 10 mL (has no administration in time range)   acetaminophen (TYLENOL) tablet 650 mg (has no administration in time range)     Or   acetaminophen (TYLENOL) 160 MG/5ML solution 650 mg (has no administration in time range)     Or   acetaminophen (TYLENOL) suppository 650 mg (has no administration in time range)   pantoprazole (PROTONIX) injection 40 mg (has no administration in time range)   sodium chloride 0.9 % bolus 1,000 mL (0 mL Intravenous Stopped 1/21/21 1326)   pantoprazole (PROTONIX) injection 80 mg (80 mg Intravenous Given 1/21/21 1007)   iopamidol (ISOVUE-300) 61 % injection 100 mL (85 mL Intravenous Given by Other 1/21/21 1127)                   MEDICAL DECISION MAKING, PROGRESS, and CONSULTS    All labs have been independently reviewed by me.  All radiology studies have been reviewed by me and discussed with radiologist dictating the report.   EKG's independently viewed and interpreted by me.  Discussion below represents my analysis of pertinent findings related to patient's condition, differential diagnosis, treatment plan and final disposition.    ED Course as of Jan 21 1339   Thu Jan 21, 2021   0956 Medical records reviewed: I reviewed urgent care visit from earlier today.  Patient had rectal exam performed there that showed no external hemorrhoid and his stool was guaiac positive.  He was sent to the ER for further evaluation.    [JR]   0959 Differential diagnosis includes esophageal varices, gastritis, peptic ulcer disease, duodenal ulcer.  I have ordered labs and IV fluids.  He was also given a PPI.    [JR]   1030 INR: 1.03 [JR]   1030 PTT: 24.4 [JR]   1030 Lipase: 25 [JR]   1030 Creatinine: 1.11  [JR]   1030 ALT (SGPT): 35 [JR]   1030 AST (SGOT): 31 [JR]   1030 Hemoglobin: 15.8 [JR]   1030 Platelets: 273 [JR]   1054 Patient informed of reassuring lab work and plan to obtain CT abdomen pelvis for further evaluation of symptoms.    [JR]   1155 I discussed the CT abdomen with Dr. Potter, radiologist.  She reports diffuse enteritis that is moderate severity with associated mesenteric fluid.  There is no pneumatosis of the small bowel.    [JR]   1249 Discussed with CELSO Whitaker for [facility], who agrees to admit on behalf of Dr. Noel.    [JR]      ED Course User Index  [JR] Nj Escalera MD       Patient with 3 days of watery diarrhea that has been black.  Stool heme positive today.  Hemoglobin 15.  He has a nonsurgical abdomen on exam however CT demonstrates significant enteritis with some mesenteric edema.  Given these findings, I recommended admission for continued monitoring, IV fluids.  Patient was given clear liquids here and his diet can be advanced later if tolerated.  He was given IV PPI.         I wore a surgical mask, gloves, and eye protection during this patient encounter.  Patient also wearing a surgical mask.  Hand hygeine performed before and after seeing the patient.    DIAGNOSIS  Final diagnoses:   Enteritis   Heme positive stool         DISPOSITION  ADMIT            Latest Documented Vital Signs:  As of 13:39 EST  BP- 115/82 HR- 93 Temp- 97 °F (36.1 °C) O2 sat- 97%        --    Please note that portions of this were completed with a voice recognition program.          Nj Escalera MD  01/21/21 5925

## 2021-01-22 ENCOUNTER — READMISSION MANAGEMENT (OUTPATIENT)
Dept: CALL CENTER | Facility: HOSPITAL | Age: 46
End: 2021-01-22

## 2021-01-22 ENCOUNTER — TELEPHONE (OUTPATIENT)
Dept: GASTROENTEROLOGY | Facility: CLINIC | Age: 46
End: 2021-01-22

## 2021-01-22 VITALS
WEIGHT: 204.4 LBS | RESPIRATION RATE: 16 BRPM | HEIGHT: 72 IN | TEMPERATURE: 98.5 F | HEART RATE: 61 BPM | OXYGEN SATURATION: 97 % | BODY MASS INDEX: 27.68 KG/M2 | SYSTOLIC BLOOD PRESSURE: 105 MMHG | DIASTOLIC BLOOD PRESSURE: 68 MMHG

## 2021-01-22 LAB
ANION GAP SERPL CALCULATED.3IONS-SCNC: 8.2 MMOL/L (ref 5–15)
BUN SERPL-MCNC: 9 MG/DL (ref 6–20)
BUN/CREAT SERPL: 9.9 (ref 7–25)
CALCIUM SPEC-SCNC: 8.5 MG/DL (ref 8.6–10.5)
CHLORIDE SERPL-SCNC: 110 MMOL/L (ref 98–107)
CO2 SERPL-SCNC: 23.8 MMOL/L (ref 22–29)
CREAT SERPL-MCNC: 0.91 MG/DL (ref 0.76–1.27)
DEPRECATED RDW RBC AUTO: 37.9 FL (ref 37–54)
ERYTHROCYTE [DISTWIDTH] IN BLOOD BY AUTOMATED COUNT: 12.7 % (ref 12.3–15.4)
GFR SERPL CREATININE-BSD FRML MDRD: 90 ML/MIN/1.73
GLUCOSE SERPL-MCNC: 84 MG/DL (ref 65–99)
HCT VFR BLD AUTO: 38.1 % (ref 37.5–51)
HCT VFR BLD AUTO: 38.1 % (ref 37.5–51)
HCT VFR BLD AUTO: 40.3 % (ref 37.5–51)
HGB BLD-MCNC: 13 G/DL (ref 13–17.7)
HGB BLD-MCNC: 13 G/DL (ref 13–17.7)
HGB BLD-MCNC: 13.3 G/DL (ref 13–17.7)
MCH RBC QN AUTO: 28.4 PG (ref 26.6–33)
MCHC RBC AUTO-ENTMCNC: 34.1 G/DL (ref 31.5–35.7)
MCV RBC AUTO: 83.4 FL (ref 79–97)
PLATELET # BLD AUTO: 204 10*3/MM3 (ref 140–450)
PMV BLD AUTO: 9.9 FL (ref 6–12)
POTASSIUM SERPL-SCNC: 4.2 MMOL/L (ref 3.5–5.2)
RBC # BLD AUTO: 4.57 10*6/MM3 (ref 4.14–5.8)
SODIUM SERPL-SCNC: 142 MMOL/L (ref 136–145)
WBC # BLD AUTO: 5.69 10*3/MM3 (ref 3.4–10.8)

## 2021-01-22 PROCEDURE — 36415 COLL VENOUS BLD VENIPUNCTURE: CPT | Performed by: NURSE PRACTITIONER

## 2021-01-22 PROCEDURE — 85014 HEMATOCRIT: CPT | Performed by: NURSE PRACTITIONER

## 2021-01-22 PROCEDURE — 96361 HYDRATE IV INFUSION ADD-ON: CPT

## 2021-01-22 PROCEDURE — 85027 COMPLETE CBC AUTOMATED: CPT | Performed by: NURSE PRACTITIONER

## 2021-01-22 PROCEDURE — 96376 TX/PRO/DX INJ SAME DRUG ADON: CPT

## 2021-01-22 PROCEDURE — 85018 HEMOGLOBIN: CPT | Performed by: NURSE PRACTITIONER

## 2021-01-22 PROCEDURE — G0378 HOSPITAL OBSERVATION PER HR: HCPCS

## 2021-01-22 PROCEDURE — 80048 BASIC METABOLIC PNL TOTAL CA: CPT | Performed by: NURSE PRACTITIONER

## 2021-01-22 RX ADMIN — PANTOPRAZOLE SODIUM 40 MG: 40 INJECTION, POWDER, FOR SOLUTION INTRAVENOUS at 07:06

## 2021-01-22 RX ADMIN — LISINOPRIL 20 MG: 20 TABLET ORAL at 08:33

## 2021-01-22 RX ADMIN — SODIUM CHLORIDE 125 ML/HR: 9 INJECTION, SOLUTION INTRAVENOUS at 05:25

## 2021-01-22 NOTE — DISCHARGE SUMMARY
Patient Name: Dg Harden  : 1975  MRN: [id number]    Date of Admission: 2021  Date of Discharge:  2021  Primary Care Physician: Khoi Man MD      Chief Complaint:   Black or Bloody Stool and Abdominal Pain      Discharge Diagnoses     Active Hospital Problems    Diagnosis  POA   • **Enteritis, suspect viral [K52.9]  Yes   • Heme positive stool [R19.5]  Unknown   • Overweight (BMI 25.0-29.9) [E66.3]  Yes   • Gastroesophageal reflux disease without esophagitis [K21.9]  Yes   • Essential hypertension [I10]  Yes      Resolved Hospital Problems   No resolved problems to display.        Hospital Course     Mr. Harden is a 45 y.o. male with a history of GERD and hypertension who presented to [facility] initially complaining of abdominal pain and dark stools.  Please see the admitting H&P for further details.  He was found to have enteritis on CT scan and was admitted to the hospital for further evaluation and treatment.  Stool studies were negative.  Hemoglobin has remained stable.  His diarrhea has improved and has returned to normal brown color.  Stool PCR study was negative.  Still could be viral in etiology.  He is 45 years old and has never had colonoscopy therefore is due.  Discussed with Dr. Velasquez who will try to get him a follow-up appointment soon for colonoscopy.  Will follow up with PCP as well.  He was told to return to ED for any worsening of symptoms or recurrence of black or bloody stools or any additional concerns.  Will avoid dairy for now until diarrhea resolves.  Avoid NSAIDs.      Day of Discharge     Subjective:  Feels better.  Agreeable to discharge home.    Review of Systems    Physical Exam:  Temp:  [98 °F (36.7 °C)-98.5 °F (36.9 °C)] 98.5 °F (36.9 °C)  Heart Rate:  [54-93] 61  Resp:  [16-18] 16  BP: (105-118)/(68-82) 105/68  Body mass index is 27.72 kg/m².  Physical Exam  Constitutional:       General: He is not in acute distress.     Appearance: He is  not diaphoretic.   Cardiovascular:      Rate and Rhythm: Normal rate and regular rhythm.      Heart sounds: No murmur.   Pulmonary:      Effort: Pulmonary effort is normal.      Breath sounds: Normal breath sounds.   Abdominal:      General: Bowel sounds are normal. There is no distension.      Palpations: Abdomen is soft.      Tenderness: There is no abdominal tenderness.   Musculoskeletal: Normal range of motion.   Skin:     General: Skin is warm and dry.   Neurological:      Mental Status: He is alert and oriented to person, place, and time.         Consultants     Consult Orders (all) (From admission, onward)     Start     Ordered    01/21/21 1158  [facility] (on-call MD unless specified) Details  Once     Specialty:  Hospitalist  Provider:  (Not yet assigned)    01/21/21 1157              Procedures     Imaging Results (All)     Procedure Component Value Units Date/Time    CT Abdomen Pelvis With Contrast [[id number]] Collected: 01/21/21 1306     Updated: 01/21/21 1306    Narrative:      CT ABDOMEN AND PELVIS WITH CONTRAST     HISTORY: Abdominal pain and diarrhea.     TECHNIQUE: Axial CT images of the abdomen and pelvis were obtained  following administration of intravenous contrast. The patient was not  given oral contrast Coronal and sagittal reformats were obtained.     COMPARISON: 06/06/2011     FINDINGS: The mid and distal small bowel loops demonstrate diffuse wall  thickening with submucosal edema and mild mucosal hyperenhancement.  There is adjacent mesenteric stranding and fluid present. The  distal/terminal ileum is, however, unremarkable. The appendix is normal.  The colon is fluid-filled. There is no pneumatosis present. Small amount  of layering fluid is seen within the pelvis. No evidence of bowel  obstruction.     The liver, gallbladder, spleen and the pancreas is normal. Bilateral  adrenal glands and kidneys are unremarkable. No renal calculi or  hydronephrosis. The urinary bladder is partially distended  and normal.       Impression:      There is pretty significant wall thickening involving the  mid to distal small bowel loops with adjacent mesenteric stranding and  fluid present. The colon remains fluid-filled. The findings are  suggestive of a significant enteritis.     These findings were discussed with Dr. Escalera by telephone.     Radiation dose reduction techniques were utilized, including automated  exposure control and exposure modulation based on body size.                    Pertinent Labs     Results from last 7 days   Lab Units 01/22/21  0809 01/21/21  2323 01/21/21  0949   WBC 10*3/mm3 5.69  --  8.92   HEMOGLOBIN g/dL 13.0  13.0 13.3 15.8   PLATELETS 10*3/mm3 204  --  273     Results from last 7 days   Lab Units 01/22/21  0809 01/21/21  0949   SODIUM mmol/L 142 138   POTASSIUM mmol/L 4.2 3.8   CHLORIDE mmol/L 110* 101   CO2 mmol/L 23.8 25.0   BUN mg/dL 9 15   CREATININE mg/dL 0.91 1.11   GLUCOSE mg/dL 84 106*   Estimated Creatinine Clearance: 133 mL/min (by C-G formula based on SCr of 0.91 mg/dL).  Results from last 7 days   Lab Units 01/21/21  0949   ALBUMIN g/dL 4.70   BILIRUBIN mg/dL 0.5   ALK PHOS U/L 60   AST (SGOT) U/L 31   ALT (SGPT) U/L 35     Results from last 7 days   Lab Units 01/22/21  0809 01/21/21  0949   CALCIUM mg/dL 8.5* 9.5   ALBUMIN g/dL  --  4.70     Results from last 7 days   Lab Units 01/21/21  0949   LIPASE U/L 25             Invalid input(s): LDLCALC      Results from last 7 days   Lab Units 01/21/21  1327   COVID19  Not Detected       Test Results Pending at Discharge       Discharge Details        Discharge Medications      Continue These Medications      Instructions Start Date   lisinopril 20 MG tablet  Commonly known as: PRINIVIL,ZESTRIL   20 mg, Oral, Daily      omeprazole 40 MG capsule  Commonly known as: priLOSEC   40 mg, Oral, Daily             No Known Allergies      Discharge Disposition:  Home or Self Care    Discharge Diet:  Diet Order   Procedures   • Diet Regular        Discharge Activity:   Activity Instructions     Activity as Tolerated            CODE STATUS:    Code Status and Medical Interventions:   Ordered at: 01/21/21 1256     Level Of Support Discussed With:    Patient     Code Status:    CPR     Medical Interventions (Level of Support Prior to Arrest):    Full       No future appointments.  Follow-up Information     Khoi Man MD Follow up in 1 week(s).    Specialty: Family Medicine  Contact information:  [address] [address] [address]  [facility] [id number] [phone]             Greta Velasquez MD Follow up.    Specialty: Gastroenterology  Why: their office will call with appointment  Contact information:  [address] [facility] [address]  [facility] [id number] [phone]                   Marcus Noel MD  [facility]  01/22/21  10:52 EST

## 2021-01-22 NOTE — TELEPHONE ENCOUNTER
Call to schedule [facility] fu appt with me (new pt) on 2/18 at 3694 - let me know if this doesn't work

## 2021-01-22 NOTE — TELEPHONE ENCOUNTER
Called pt and left vm for pt to call back On vm advised of appt time and requested pt call back and confirm appt for 02/18 at 1245 with Dr Velasquez.

## 2021-01-22 NOTE — PROGRESS NOTES
Case Management Discharge Note      Final Note: Home--no needs         Selected Continued Care - Discharged on 1/22/2021 Admission date: 1/21/2021 - Discharge disposition: Home or Self Care    Destination    No services have been selected for the patient.              Durable Medical Equipment    No services have been selected for the patient.              Dialysis/Infusion    No services have been selected for the patient.              Home Medical Care    No services have been selected for the patient.              Therapy    No services have been selected for the patient.              Community Resources    No services have been selected for the patient.                       Final Discharge Disposition Code: 01 - home or self-care

## 2021-01-22 NOTE — PLAN OF CARE
Goal Outcome Evaluation:  Plan of Care Reviewed With: patient  Progress: no change   The patient rested comfortably during this shift. No c/o pain or nausea. The patient continues on IV fluids. Had a BM last night, loose. VSS, will continue to monitor.    [address] Hospitalist        2/9/2020   11:28 AM    Name:  Becki Coyle  MRN:    [id number]     Acct:     [de-identified]   Room:  [address] [place] [address] Day: 2     Admit Date: 2/7/2020  8:05 PM  PCP: Janet Fox MD    C/C:   Chief Complaint   Patient presents with   She[place] August Fever     Has no fever in triage and is diaphoretic in triage.  Emesis     Drinks about 8 ounces vodka a day    Headache    Abdominal Pain     was in rehab 11/4 for ETOHism       Assessment:      · Alcohol withdrawal syndrome  · Alcohol dependence  · Major depressive disorder  · Anxiety disorder  · Osteoarthritis multiple joints  · Essential hypertension  · H/O CVA/ TIA in 2008  · COPD  · Spinal stenosis   · Non intractable vomiting with nausea  · Acute gastritis  · Hypomagnesemia  · Lactic acidosis-resolved  · Hypokalemia  -resolved        Plan:        · Admit to ICU  · Monitor vitals closely  · Keep spO2 above 90%  · IV Precedex- off now  · Monitor anxiety/ agitation  · IV fluids  · B12/ Folate/ MV IV  · Kept NPO initially  · Clear liquids for now  · If tolerate, advance diet   · Is/ Os  · Zofran prn for nausea  · Ultram for headaches prn  · Incentive spirometry   · Check CBC, BMP  · Check Lactic acid  · CIWA protocol  · Consult SW  · PPI for gastritis  · Restart Lexapro  · Restart Lopressor  · Restart Remeron   · Alcohol rehab once detox complete   · DVT and GI prophylaxis  · DC Planning      Subjective:     Patient seen and examined at bedside. No overnight events. No acute complaints today. Afebrile    Pt. Denies any CP, SOB, palpitation, HA, dizziness, chills, cough, cold, changes in urination, BM or skin changes or any pain. Pt feels really good  Offered rehab  Pt says he is set w counseling and AA meetings at his [facility]  Says his family are very involved in his care too    ROS:  A 10 point system reviewed and negative otherwise mentioned above.         Physical Examination:      Vitals:  /71 Pulse 67   Temp 97.9 °F (36.6 °C) (Oral)   Resp 16   Ht 5' 11\" (1.803 m)   Wt 163 lb 3 oz (74 kg)   SpO2 98%   BMI 22.76 kg/m²   Temp (24hrs), Av.9 °F (36.6 °C), Min:97.7 °F (36.5 °C), Max:98.2 °F (36.8 °C)    Weight:   Wt Readings from Last 3 Encounters:   20 163 lb 3 oz (74 kg)   19 149 lb 3.2 oz (67.7 kg)   16 186 lb (84.4 kg)     I/O last 3 completed shifts:  I/O last 3 completed shifts: In: 120 [P.O.:120]  Out: -      No results for input(s): POCGLU in the last 72 hours. General appearance - alert, well appearing, and in no acute distress  Mental status - oriented to person, place, and time with normal affect  Head - normocephalic and atraumatic  Eyes - pupils equal and reactive, extraocular eye movements intact, conjunctiva clear  Ears - hearing appears to be intact  Nose - no drainage noted  Mouth - mucous membranes moist  Neck - supple, no carotid bruits, thyroid not palpable  Chest - clear to auscultation, normal effort  Heart - normal rate, regular rhythm, no murmur  Abdomen - soft, nontender, nondistended, bowel sounds present all four quadrants, no masses, hepatomegaly or splenomegaly  Neurological - normal speech, no focal findings or movement disorder noted, cranial nerves II through XII grossly intact  Extremities - peripheral pulses palpable, no pedal edema or calf pain with palpation  Skin - no gross lesions, rashes, or induration noted        Medications: Allergies:    Allergies   Allergen Reactions    Ativan [Lorazepam]      hallucinates    Butrans [Buprenorphine]      Nausea & diaphoresis    Gabapentin     Lyrica [Pregabalin]      sedation    Motrin [Ibuprofen]        Current Meds:   Current Facility-Administered Medications:     ondansetron (ZOFRAN-ODT) disintegrating tablet 4 mg, 4 mg, Oral, Q6H PRN **OR** ondansetron (ZOFRAN) injection 4 mg, 4 mg, Intravenous, Q6H PRN, Zaria Batres MD    influenza quadrivalent split vaccine (FLUZONE;FLUARIX;FLULAVAL;AFLURIA) injection 0.5 mL, 0.5 mL, Intramuscular, Once, Zaria Batres MD    traMADol (ULTRAM) tablet 50 mg, 50 mg, Oral, Q4H PRN, Zaria Batres MD, 50 mg at 02/09/20 0610    escitalopram (LEXAPRO) tablet 10 mg, 10 mg, Oral, Daily, Zaria Batres MD, 10 mg at 02/09/20 0818    metoprolol tartrate (LOPRESSOR) tablet 50 mg, 50 mg, Oral, Daily, Zaria Batres MD, 50 mg at 02/09/20 0832    dexmedetomidine (PRECEDEX) 400 mcg in sodium chloride 0.9 % 100 mL infusion, 0.2 mcg/kg/hr, Intravenous, Continuous, Zaria Batres MD, Stopped at 02/08/20 1438    sodium chloride flush 0.9 % injection 10 mL, 10 mL, Intravenous, PRN, Zaria Batres MD, 10 mL at 02/07/20 2128    sodium chloride flush 0.9 % injection 10 mL, 10 mL, Intravenous, 2 times per day, Cortes Pereira MD, 10 mL at 02/08/20 2116    sodium chloride flush 0.9 % injection 10 mL, 10 mL, Intravenous, PRN, Zaria Batres MD    magnesium hydroxide (MILK OF MAGNESIA) 400 MG/5ML suspension 30 mL, 30 mL, Oral, Daily PRN, Zaria Batres MD    nicotine (NICODERM CQ) 21 MG/24HR 1 patch, 1 patch, Transdermal, Daily PRN, Zaria Batres MD    acetaminophen (TYLENOL) tablet 650 mg, 650 mg, Oral, Q4H PRN, Zaria Batres MD    enoxaparin (LOVENOX) injection 40 mg, 40 mg, Subcutaneous, Daily, Zaria Batres MD, 40 mg at 02/09/20 8371    sodium chloride 0.9 % 3,034 mL with folic acid 1 mg, adult multi-vitamin with vitamin k 10 mL, thiamine 100 mg, , Intravenous, Daily, Zaria Batres MD, Last Rate: 100 mL/hr at 02/09/20 9026      I/O (24Hr):     Intake/Output Summary (Last 24 hours) at 2/9/2020 1128  Last data filed at 2/8/2020 2000  Gross per 24 hour   Intake 120 ml   Output --   Net 120 ml       Data:           Labs:    Hematology:  Recent Labs     02/07/20 2021 02/08/20  0653 02/09/20  0532   WBC 11.2 6.0 6.1   RBC 4.50 4.18* 4.15*   HGB 13.7 12.7* 12.4*   HCT 41.1 38.7* 39.1*   MCV 91.3 92.6 94.2   MCH 30.4 30.4 29.9   MCHC 33.3 32.8 31.7

## 2021-01-22 NOTE — OUTREACH NOTE
Prep Survey      Responses   [facility] facility patient discharged from?  [place]   Is LACE score < 7 ?  Yes   Emergency Room discharge w/ pulse ox?  No   Eligibility  [facility]   Date of Admission  01/21/21   Date of Discharge  01/22/21   Discharge Disposition  Home or Self Care   Discharge diagnosis  Enteritis   Does the patient have one of the following disease processes/diagnoses(primary or secondary)?  Other   Does the patient have Home health ordered?  No   Is there a DME ordered?  No   Prep survey completed?  Yes          Doreen Lai RN

## 2021-01-25 ENCOUNTER — TRANSITIONAL CARE MANAGEMENT TELEPHONE ENCOUNTER (OUTPATIENT)
Dept: CALL CENTER | Facility: HOSPITAL | Age: 46
End: 2021-01-25

## 2021-01-25 NOTE — OUTREACH NOTE
Call Center TCM Note      Responses   [facility] patient discharged from?  [place]   Does the patient have one of the following disease processes/diagnoses(primary or secondary)?  Other   TCM attempt successful?  No   Unsuccessful attempts  Attempt 1 [Outdated verbal release]          Doreen Ryan RN    1/25/2021, 10:12 EST

## 2021-01-25 NOTE — OUTREACH NOTE
Call Center TCM Note      Responses   [facility] patient discharged from?  [place]   Does the patient have one of the following disease processes/diagnoses(primary or secondary)?  Other   TCM attempt successful?  Yes   Call start time  1252   Call end time  1256   Discharge diagnosis  Enteritis   Is patient permission given to speak with other caregiver?  No   Meds reviewed with patient/caregiver?  Yes   Is the patient having any side effects they believe may be caused by any medication additions or changes?  No   Does the patient have all medications ordered at discharge?  N/A [No new medications ordered. Avoid NSAIDS. ]   Is the patient taking all medications as directed (includes completed medication regime)?  Yes   Does the patient have a primary care provider?   Yes   Does the patient have an appointment with their PCP within 7 days of discharge?  Greater than 7 days   Comments regarding PCP  PCP Dr Khoi Man. Hospital f/u appt scheduled for Monday 2/1  9am   What is preventing the patient from scheduling follow up appointments within 7 days of discharge?  Earlier appointment not available   Nursing Interventions  -- [Scheduled needed TCM appt. ]   Has the patient kept scheduled appointments due by today?  N/A   Comments  Patient to also f/u with gastoenterology Dr Velasquez.    Has home health visited the patient within 72 hours of discharge?  N/A   Psychosocial issues?  No   Did the patient receive a copy of their discharge instructions?  Yes   Nursing interventions  Reviewed instructions with patient   What is the patient's perception of their health status since discharge?  Improving [Patient states tolerating diet without problems since discharge. Denies any black or bloody stools. ]   Is the patient/caregiver able to teach back signs and symptoms related to disease process for when to call PCP?  Yes   Is the patient/caregiver able to teach back the hierarchy of who to call/visit for  symptoms/problems? PCP, Specialist, Home health nurse, Urgent Care, ED, 911  Yes   If the patient is a current smoker, are they able to teach back resources for cessation?  Not a smoker   Additional teach back comments  Patient to avoid dairy until diarrhea resolves and avoid NSAID drugs.    TCM call completed?  Yes   Wrap up additional comments  Patient denies any questions or new concerns today.           Doreen Ryan RN    1/25/2021, 12:56 EST

## 2021-02-01 ENCOUNTER — OFFICE VISIT (OUTPATIENT)
Dept: FAMILY MEDICINE CLINIC | Facility: CLINIC | Age: 46
End: 2021-02-01

## 2021-02-01 VITALS
WEIGHT: 204 LBS | TEMPERATURE: 96.9 F | HEART RATE: 87 BPM | SYSTOLIC BLOOD PRESSURE: 125 MMHG | RESPIRATION RATE: 16 BRPM | OXYGEN SATURATION: 98 % | DIASTOLIC BLOOD PRESSURE: 81 MMHG | HEIGHT: 72 IN | BODY MASS INDEX: 27.63 KG/M2

## 2021-02-01 DIAGNOSIS — I10 ESSENTIAL HYPERTENSION: ICD-10-CM

## 2021-02-01 DIAGNOSIS — E66.3 OVERWEIGHT (BMI 25.0-29.9): ICD-10-CM

## 2021-02-01 DIAGNOSIS — K21.9 GASTROESOPHAGEAL REFLUX DISEASE WITHOUT ESOPHAGITIS: ICD-10-CM

## 2021-02-01 DIAGNOSIS — R19.5 HEME POSITIVE STOOL: ICD-10-CM

## 2021-02-01 DIAGNOSIS — K52.9 ENTERITIS: ICD-10-CM

## 2021-02-01 DIAGNOSIS — Z09 HOSPITAL DISCHARGE FOLLOW-UP: Primary | ICD-10-CM

## 2021-02-01 DIAGNOSIS — M71.21 BAKER'S CYST OF KNEE, RIGHT: ICD-10-CM

## 2021-02-01 PROCEDURE — 99495 TRANSJ CARE MGMT MOD F2F 14D: CPT | Performed by: FAMILY MEDICINE

## 2021-02-01 RX ORDER — OMEPRAZOLE 40 MG/1
40 CAPSULE, DELAYED RELEASE ORAL DAILY
Qty: 90 CAPSULE | Refills: 3 | Status: SHIPPED | OUTPATIENT
Start: 2021-02-01 | End: 2022-01-14

## 2021-02-01 RX ORDER — LISINOPRIL 20 MG/1
20 TABLET ORAL DAILY
Qty: 90 TABLET | Refills: 3 | Status: SHIPPED | OUTPATIENT
Start: 2021-02-01 | End: 2022-01-14

## 2021-02-01 NOTE — ASSESSMENT & PLAN NOTE
The current medical regimen is effective;  continue present plan and medications.  Pt will discuss with GI as well.

## 2021-02-18 ENCOUNTER — OFFICE VISIT (OUTPATIENT)
Dept: GASTROENTEROLOGY | Facility: CLINIC | Age: 46
End: 2021-02-18

## 2021-02-18 ENCOUNTER — TRANSCRIBE ORDERS (OUTPATIENT)
Dept: SLEEP MEDICINE | Facility: HOSPITAL | Age: 46
End: 2021-02-18

## 2021-02-18 ENCOUNTER — TELEPHONE (OUTPATIENT)
Dept: GASTROENTEROLOGY | Facility: CLINIC | Age: 46
End: 2021-02-18

## 2021-02-18 VITALS — WEIGHT: 208 LBS | TEMPERATURE: 97.1 F | BODY MASS INDEX: 28.17 KG/M2 | HEIGHT: 72 IN

## 2021-02-18 DIAGNOSIS — Z12.11 ENCOUNTER FOR SCREENING COLONOSCOPY: ICD-10-CM

## 2021-02-18 DIAGNOSIS — Z01.818 OTHER SPECIFIED PRE-OPERATIVE EXAMINATION: Primary | ICD-10-CM

## 2021-02-18 DIAGNOSIS — A09 INFECTIOUS ENTERITIS, UNSPECIFIED INFECTIOUS AGENT: Primary | ICD-10-CM

## 2021-02-18 DIAGNOSIS — K21.9 GASTROESOPHAGEAL REFLUX DISEASE, UNSPECIFIED WHETHER ESOPHAGITIS PRESENT: ICD-10-CM

## 2021-02-18 DIAGNOSIS — R19.5 HEME POSITIVE STOOL: ICD-10-CM

## 2021-02-18 PROCEDURE — 99203 OFFICE O/P NEW LOW 30 MIN: CPT | Performed by: INTERNAL MEDICINE

## 2021-02-18 NOTE — PROGRESS NOTES
Subjective   Chief Complaint   Patient presents with   • Hospital Follow Up Visit   • Abdominal Pain   • Rectal Bleeding       Dg Harden is a  45 y.o. male here for an initial patient visit for follow-up of enteritis.  Patient had recent hospitalization for enteritis.  He had negative stool studies during that hospitalization.  Initially he had black stool which then resolved over the course of his hospitalization.  This was accompanied by abdominal pain and diarrhea.  He was noted to be heme positive during that hospitalization in the setting of his acute illness.  His hemoglobin remained normal throughout his hospitalization.    He has never had a screening colonoscopy.  No family history of colon cancer or polyps.  He has longstanding acid reflux which is well controlled with omeprazole 40 mg daily.    Since his discharge he has had no further symptoms.  His appetite is returned.  Stool has been normal in color and consistency.  No further abdominal pain or diarrhea.  HPI  Past Medical History:   Diagnosis Date   • Hyperlipidemia    • Hypertension      Past Surgical History:   Procedure Laterality Date   • SEPTOPLASTY  Feb 2009   • VASECTOMY         Current Outpatient Medications:   •  lisinopril (PRINIVIL,ZESTRIL) 20 MG tablet, Take 1 tablet by mouth Daily., Disp: 90 tablet, Rfl: 3  •  omeprazole (priLOSEC) 40 MG capsule, Take 1 capsule by mouth Daily., Disp: 90 capsule, Rfl: 3  PRN Meds:.  No Known Allergies  Social History     Socioeconomic History   • Marital status:      Spouse name: Not on file   • Number of children: Not on file   • Years of education: Not on file   • Highest education level: Not on file   Tobacco Use   • Smoking status: Never Smoker   • Smokeless tobacco: Never Used   Substance and Sexual Activity   • Alcohol use: Yes     Alcohol/week: 5.0 standard drinks     Types: 5 Cans of beer per week   • Drug use: No   • Sexual activity: Yes     Partners: Female     Birth  control/protection: Surgical     Family History   Problem Relation Age of Onset   • Hypertension Father    • Hyperlipidemia Father    • Stroke Maternal Grandmother    • Heart disease Paternal Grandfather      Review of Systems   Constitutional: Negative for appetite change and unexpected weight change.   Gastrointestinal: Negative for abdominal pain, blood in stool and diarrhea.     Vitals:    02/18/21 1241   Temp: 97.1 °F (36.2 °C)         02/18/21  1241   Weight: 94.3 kg (208 lb)       Objective   Physical Exam  Constitutional:       Appearance: Normal appearance. He is not ill-appearing.   Pulmonary:      Effort: Pulmonary effort is normal.      Breath sounds: Normal breath sounds.   Abdominal:      General: Abdomen is flat. There is no distension.       No radiology results for the last 7 days    Assessment/Plan   Diagnoses and all orders for this visit:    Infectious enteritis, unspecified infectious agent    Heme positive stool  Comments:  In the setting of acute infection    Encounter for screening colonoscopy  -     Case Request; Standing  -     Case Request    Gastroesophageal reflux disease, unspecified whether esophagitis present  -     Case Request; Standing  -     Case Request    Other orders  -     Follow Anesthesia Guidelines / Standing Orders; Future  -     Obtain Informed Consent; Future  -     Implement Anesthesia orders day of procedure.; Standing  -     Obtain informed consent; Standing  -     Verify bowel prep was successful; Standing  -     Give tap water enema if bowel prep was insufficient; Standing      Plan:  · Symptoms from recent enteritis have completely resolved  · Recommend EGD to screen for Duron's given his history of longstanding acid reflux.  He is due for screening colonoscopy as well.  This will be scheduled.

## 2021-02-20 ENCOUNTER — LAB (OUTPATIENT)
Dept: LAB | Facility: HOSPITAL | Age: 46
End: 2021-02-20

## 2021-02-20 DIAGNOSIS — Z01.818 OTHER SPECIFIED PRE-OPERATIVE EXAMINATION: ICD-10-CM

## 2021-02-20 PROCEDURE — C9803 HOPD COVID-19 SPEC COLLECT: HCPCS

## 2021-02-20 PROCEDURE — U0004 COV-19 TEST NON-CDC HGH THRU: HCPCS

## 2021-02-22 LAB — SARS-COV-2 RNA RESP QL NAA+PROBE: NOT DETECTED

## 2021-02-23 ENCOUNTER — ANESTHESIA EVENT (OUTPATIENT)
Dept: GASTROENTEROLOGY | Facility: HOSPITAL | Age: 46
End: 2021-02-23

## 2021-02-23 ENCOUNTER — ANESTHESIA (OUTPATIENT)
Dept: GASTROENTEROLOGY | Facility: HOSPITAL | Age: 46
End: 2021-02-23

## 2021-02-23 ENCOUNTER — HOSPITAL ENCOUNTER (OUTPATIENT)
Facility: HOSPITAL | Age: 46
Setting detail: HOSPITAL OUTPATIENT SURGERY
Discharge: HOME | End: 2021-02-23
Attending: INTERNAL MEDICINE | Admitting: INTERNAL MEDICINE

## 2021-02-23 VITALS
HEIGHT: 72 IN | SYSTOLIC BLOOD PRESSURE: 124 MMHG | BODY MASS INDEX: 27.22 KG/M2 | RESPIRATION RATE: 20 BRPM | DIASTOLIC BLOOD PRESSURE: 78 MMHG | HEART RATE: 71 BPM | WEIGHT: 201 LBS | OXYGEN SATURATION: 97 %

## 2021-02-23 DIAGNOSIS — Z12.11 ENCOUNTER FOR SCREENING COLONOSCOPY: ICD-10-CM

## 2021-02-23 DIAGNOSIS — K21.9 GASTROESOPHAGEAL REFLUX DISEASE, UNSPECIFIED WHETHER ESOPHAGITIS PRESENT: ICD-10-CM

## 2021-02-23 PROCEDURE — 25010000002 PROPOFOL 10 MG/ML EMULSION: Performed by: ANESTHESIOLOGY

## 2021-02-23 PROCEDURE — 43239 EGD BIOPSY SINGLE/MULTIPLE: CPT | Performed by: INTERNAL MEDICINE

## 2021-02-23 PROCEDURE — 45380 COLONOSCOPY AND BIOPSY: CPT | Performed by: INTERNAL MEDICINE

## 2021-02-23 PROCEDURE — S0260 H&P FOR SURGERY: HCPCS | Performed by: INTERNAL MEDICINE

## 2021-02-23 PROCEDURE — 88305 TISSUE EXAM BY PATHOLOGIST: CPT | Performed by: INTERNAL MEDICINE

## 2021-02-23 RX ORDER — SODIUM CHLORIDE 0.9 % (FLUSH) 0.9 %
10 SYRINGE (ML) INJECTION AS NEEDED
Status: DISCONTINUED | OUTPATIENT
Start: 2021-02-23 | End: 2021-02-23 | Stop reason: HOSPADM

## 2021-02-23 RX ORDER — LIDOCAINE HYDROCHLORIDE 20 MG/ML
INJECTION, SOLUTION INFILTRATION; PERINEURAL AS NEEDED
Status: DISCONTINUED | OUTPATIENT
Start: 2021-02-23 | End: 2021-02-23 | Stop reason: SURG

## 2021-02-23 RX ORDER — SODIUM CHLORIDE, SODIUM LACTATE, POTASSIUM CHLORIDE, CALCIUM CHLORIDE 600; 310; 30; 20 MG/100ML; MG/100ML; MG/100ML; MG/100ML
INJECTION, SOLUTION INTRAVENOUS CONTINUOUS PRN
Status: DISCONTINUED | OUTPATIENT
Start: 2021-02-23 | End: 2021-02-23 | Stop reason: SURG

## 2021-02-23 RX ORDER — PROPOFOL 10 MG/ML
VIAL (ML) INTRAVENOUS CONTINUOUS PRN
Status: DISCONTINUED | OUTPATIENT
Start: 2021-02-23 | End: 2021-02-23 | Stop reason: SURG

## 2021-02-23 RX ORDER — PROPOFOL 10 MG/ML
VIAL (ML) INTRAVENOUS AS NEEDED
Status: DISCONTINUED | OUTPATIENT
Start: 2021-02-23 | End: 2021-02-23 | Stop reason: SURG

## 2021-02-23 RX ORDER — LIDOCAINE HYDROCHLORIDE 10 MG/ML
0.5 INJECTION, SOLUTION INFILTRATION; PERINEURAL ONCE AS NEEDED
Status: DISCONTINUED | OUTPATIENT
Start: 2021-02-23 | End: 2021-02-23 | Stop reason: HOSPADM

## 2021-02-23 RX ORDER — SODIUM CHLORIDE, SODIUM LACTATE, POTASSIUM CHLORIDE, CALCIUM CHLORIDE 600; 310; 30; 20 MG/100ML; MG/100ML; MG/100ML; MG/100ML
1000 INJECTION, SOLUTION INTRAVENOUS CONTINUOUS
Status: DISCONTINUED | OUTPATIENT
Start: 2021-02-23 | End: 2021-02-23 | Stop reason: HOSPADM

## 2021-02-23 RX ADMIN — SODIUM CHLORIDE, POTASSIUM CHLORIDE, SODIUM LACTATE AND CALCIUM CHLORIDE: 600; 310; 30; 20 INJECTION, SOLUTION INTRAVENOUS at 10:39

## 2021-02-23 RX ADMIN — SODIUM CHLORIDE, POTASSIUM CHLORIDE, SODIUM LACTATE AND CALCIUM CHLORIDE 1000 ML: 600; 310; 30; 20 INJECTION, SOLUTION INTRAVENOUS at 10:32

## 2021-02-23 RX ADMIN — PROPOFOL 160 MCG/KG/MIN: 10 INJECTION, EMULSION INTRAVENOUS at 10:39

## 2021-02-23 RX ADMIN — LIDOCAINE HYDROCHLORIDE 60 MG: 20 INJECTION, SOLUTION INFILTRATION; PERINEURAL at 10:39

## 2021-02-23 RX ADMIN — PROPOFOL 100 MG: 10 INJECTION, EMULSION INTRAVENOUS at 10:39

## 2021-02-23 NOTE — ANESTHESIA POSTPROCEDURE EVALUATION
"Patient: Dg Harden    Procedure Summary     Date: 02/23/21 Room / Location:  [facility] /  [facility]    Anesthesia Start: 1037 Anesthesia Stop: 1127    Procedures:       COLONOSCOPY into cecum (N/A )      ESOPHAGOGASTRODUODENOSCOPY with biopsy (N/A Esophagus) Diagnosis:       Encounter for screening colonoscopy      Gastroesophageal reflux disease, unspecified whether esophagitis present      (Encounter for screening colonoscopy [Z12.11])      (Gastroesophageal reflux disease, unspecified whether esophagitis present [K21.9])    Surgeon: Greta Velasquez MD Provider: Luis Melendrez MD    Anesthesia Type: MAC ASA Status: 2          Anesthesia Type: MAC    Vitals  Vitals Value Taken Time   /69 02/23/21 1124   Temp     Pulse 66 02/23/21 1124   Resp 12 02/23/21 1124   SpO2 93 % 02/23/21 1124           Post Anesthesia Care and Evaluation    Patient location during evaluation: bedside  Patient participation: complete - patient participated  Level of consciousness: awake and alert  Pain management: adequate  Airway patency: patent  Anesthetic complications: No anesthetic complications    Cardiovascular status: acceptable  Respiratory status: acceptable  Hydration status: acceptable    Comments: /69   Pulse 66   Resp 12   Ht 182.9 cm (72\")   Wt 91.2 kg (201 lb)   SpO2 93%   BMI 27.26 kg/m²       "

## 2021-02-23 NOTE — H&P
"[facility]  Pre Procedure History & Physical    Chief Complaint:   GERD, screening    Subjective     HPI:   45-year-old gentleman for initial screening colonoscopy.  He has no family history of colon cancer or polyps.  He has longstanding acid reflux with no complications that he is aware of.  This has been well controlled with omeprazole.  He has never had an EGD.    Past Medical History:   Past Medical History:   Diagnosis Date   • Hyperlipidemia    • Hypertension        Past Surgical History:  Past Surgical History:   Procedure Laterality Date   • SEPTOPLASTY  Feb 2009   • VASECTOMY         Family History:  Family History   Problem Relation Age of Onset   • Hypertension Father    • Hyperlipidemia Father    • Stroke Maternal Grandmother    • Heart disease Paternal Grandfather        Social History:   reports that he has never smoked. He has never used smokeless tobacco. He reports current alcohol use of about 5.0 standard drinks of alcohol per week. He reports that he does not use drugs.    Medications:   Medications Prior to Admission   Medication Sig Dispense Refill Last Dose   • lisinopril (PRINIVIL,ZESTRIL) 20 MG tablet Take 1 tablet by mouth Daily. 90 tablet 3 2/23/2021 at Unknown time   • omeprazole (priLOSEC) 40 MG capsule Take 1 capsule by mouth Daily. 90 capsule 3 2/22/2021 at Unknown time       Allergies:  Patient has no known allergies.    ROS:    Pertinent items are noted in HPI, all other systems reviewed and negative     Objective     Blood pressure 133/88, pulse 73, resp. rate 16, height 182.9 cm (72\"), weight 91.2 kg (201 lb), SpO2 95 %.    Physical Exam   Constitutional: Pt is oriented to person, place, and time and well-developed, well-nourished, and in no distress.   Mouth/Throat: Oropharynx is clear and moist.   Neck: Normal range of motion.   Cardiovascular: Normal rate, regular rhythm    Pulmonary/Chest: Effort normal    Abdominal: Soft. Nontender  Skin: Skin is warm " and dry.   Psychiatric: Mood, memory, affect and judgment normal.     Assessment/Plan     Diagnosis:  GERD, colon cancer screening    Anticipated Surgical Procedure:  EGD/colonoscopy    The risks, benefits, and alternatives of this procedure have been discussed with the patient or the responsible party- the patient understands and agrees to proceed.

## 2021-02-23 NOTE — ANESTHESIA PREPROCEDURE EVALUATION
Anesthesia Evaluation     Patient summary reviewed and Nursing notes reviewed   NPO Solid Status: > 8 hours  NPO Liquid Status: > 2 hours           Airway   Mallampati: II  TM distance: >3 FB  Neck ROM: full  no difficulty expected  Dental - normal exam     Pulmonary - negative pulmonary ROS and normal exam   (-) decreased breath sounds, wheezes  Cardiovascular - normal exam  Exercise tolerance: good (4-7 METS)    (+) hypertension, hyperlipidemia,       Neuro/Psych- negative ROS  (-) seizures, CVA  GI/Hepatic/Renal/Endo    (+)  GERD,    (-) diabetes    Musculoskeletal (-) negative ROS    Abdominal  - normal exam   Substance History - negative use  (-) alcohol use, drug use     OB/GYN negative ob/gyn ROS         Other - negative ROS                       Anesthesia Plan    ASA 2     MAC     intravenous induction     Anesthetic plan, all risks, benefits, and alternatives have been provided, discussed and informed consent has been obtained with: patient.

## 2021-02-24 ENCOUNTER — TELEPHONE (OUTPATIENT)
Dept: GASTROENTEROLOGY | Facility: CLINIC | Age: 46
End: 2021-02-24

## 2021-02-24 LAB
LAB AP CASE REPORT: NORMAL
PATH REPORT.FINAL DX SPEC: NORMAL
PATH REPORT.GROSS SPEC: NORMAL

## 2021-02-24 NOTE — TELEPHONE ENCOUNTER
No concerning findings on esophageal biopsy.  Continue current medications for acid reflux.        The polyp(s) showed hyperplastic change, which is non-cancerous and not pre-cancerous. Follow-up colonoscopy in 10 years is advised.

## 2021-02-25 NOTE — TELEPHONE ENCOUNTER
Called pt and advised of Dr Velasquez's note.   Verb understanding.     C/s placed in recall and hm for 02/23/2031.

## 2021-02-26 ENCOUNTER — TELEPHONE (OUTPATIENT)
Dept: GASTROENTEROLOGY | Facility: CLINIC | Age: 46
End: 2021-02-26

## 2021-02-26 NOTE — TELEPHONE ENCOUNTER
Because the biopsies were normal this is not concerning.  Can be a normal finding but sometimes can be focus of change from chronic acid exposure which would have been seen on biopsy (and was not seen)

## 2021-02-26 NOTE — TELEPHONE ENCOUNTER
----- Message from Dg Harden sent at 2/25/2021  8:31 PM EST -----  Regarding: Test Results Question  Contact: [phone]  What is the significance of the irregular Z-line? Causes/implications/recommendations?

## 2021-04-02 ENCOUNTER — BULK ORDERING (OUTPATIENT)
Dept: CASE MANAGEMENT | Facility: OTHER | Age: 46
End: 2021-04-02

## 2021-04-02 DIAGNOSIS — Z23 IMMUNIZATION DUE: ICD-10-CM

## 2021-08-30 NOTE — PROGRESS NOTES
Transitional Care Follow Up Visit  Subjective     Dg Harden is a 45 y.o. male who presents for a transitional care management visit.    Within 48 business hours after discharge our office contacted him via telephone to coordinate his care and needs.      I reviewed and discussed the details of that call along with the discharge summary, hospital problems, inpatient lab results, inpatient diagnostic studies, and consultation reports with Dg.     Current outpatient and discharge medications have been reconciled for the patient.  Reviewed by: Khoi Man MD      Date of TCM Phone Call 1/22/2021   [facility]   Date of Admission 1/21/2021   Date of Discharge 1/22/2021   Discharge Disposition Home or Self Care       Risk for Readmission (LACE) Score: 1 (1/22/2021  6:00 AM)      Patient is here today to follow-up on recent hospital visit.  He went in with black tarry stools and heme positive stools.  He had diarrhea and confirmed enteritis.  This condition improved with fluid resuscitation through IVs.  Since he has been home he is almost back to normal.  He has follow-ups with gastroenterology for presumed colonoscopy later this month.  Today we reviewed the discharge summary note, reconciled medications, and reviewed labs and procedures including CT scan.  The CT scan of particular note showed some thickening and fluid which were consistent with enteritis.  Labs were mostly normal.  He did have mild decrease calcium level of 8.5 on January 22, 2021.    Patient also needs refills of his current medicines.  Blood pressure is controlled.  And his proton pump inhibitor is effective for reflux his current reflux disease.       Course During Hospital Stay(Copied from D/C Summary and reviewed during encounter on 02/01/2021 by Khoi Man MD):   Mr. Harden is a 45 y.o. male with a history of GERD and hypertension who presented to [facility] initially complaining of abdominal pain and dark  "stools.  Please see the admitting H&P for further details.  He was found to have enteritis on CT scan and was admitted to the hospital for further evaluation and treatment.  Stool studies were negative.  Hemoglobin has remained stable.  His diarrhea has improved and has returned to normal brown color.  Stool PCR study was negative.  Still could be viral in etiology.  He is 45 years old and has never had colonoscopy therefore is due.  Discussed with Dr. Velasquez who will try to get him a follow-up appointment soon for colonoscopy.  Will follow up with PCP as well.  He was told to return to ED for any worsening of symptoms or recurrence of black or bloody stools or any additional concerns.  Will avoid dairy for now until diarrhea resolves.  Avoid NSAIDs.   The following portions of the patient's history were reviewed and updated as appropriate: allergies, current medications, past family history, past medical history, past social history, past surgical history and problem list.    Vitals:    02/01/21 0859   BP: 125/81   Pulse: 87   Resp: 16   Temp: 96.9 °F (36.1 °C)   TempSrc: Tympanic   SpO2: 98%   Weight: 92.5 kg (204 lb)   Height: 182.9 cm (72\")   PainSc:   2   PainLoc: Knee       Body mass index is 27.67 kg/m².    Patient's Body mass index is 27.67 kg/m². BMI is above normal parameters. Recommendations include: educational material.       Advance Care Planning     ACP discussion was held with the patient during this visit. Patient does not have an advance directive, information provided.     Review of Systems   Constitutional: Negative for fatigue.   Cardiovascular: Negative for chest pain.   Gastrointestinal: Negative for abdominal pain, blood in stool and diarrhea.       I have reviewed the ROS documented on chart during 02/01/2021 encounter.  Khoi Man MD     Objective   Physical Exam  Vitals signs reviewed.   Constitutional:       General: He is not in acute distress.  Eyes:      General: Lids are normal.    "   Conjunctiva/sclera: Conjunctivae normal.   Neck:      Vascular: No carotid bruit.      Trachea: No tracheal deviation.   Cardiovascular:      Rate and Rhythm: Normal rate and regular rhythm.      Heart sounds: Normal heart sounds. No murmur.   Pulmonary:      Effort: Pulmonary effort is normal.      Breath sounds: Normal breath sounds.   Abdominal:      Palpations: Abdomen is soft.      Tenderness: There is no abdominal tenderness.   Musculoskeletal:      Comments: Baker's cyst right knee   Skin:     General: Skin is warm and dry.   Neurological:      Mental Status: He is alert. He is not disoriented.   Psychiatric:         Speech: Speech normal.         Behavior: Behavior normal. Behavior is cooperative.         DATA REVIEWED:  Information below has been reviewed by Khoi Man M.D. on 02/01/2021.  Data Reviewed:  ED to Hosp-Admission (Discharged) with Marcus Noel MD; Nj Escalera MD (01/21/2021)  CT Abdomen Pelvis With Contrast (01/21/2021 11:28)  Hemoglobin & Hematocrit, Blood (01/22/2021 08:09)  CBC (No Diff) (01/22/2021 08:09)  Basic Metabolic Panel (01/22/2021 08:09)  Hemoglobin & Hematocrit, Blood (01/21/2021 23:23)  Gastrointestinal Panel, PCR - Stool, Per Rectum (01/21/2021 14:30)  COVID PRE-OP / PRE-PROCEDURE SCREENING ORDER (NO ISOLATION) - Swab, Nasopharynx (01/21/2021 13:27)  Protime-INR (01/21/2021 09:50)  aPTT (01/21/2021 09:50)  Lipase (01/21/2021 09:49)  Comprehensive Metabolic Panel (01/21/2021 09:49)  CBC & Differential (01/21/2021 09:49)  POCT FECAL OCCULT BLOOD BY IMMUNOASSAY (01/21/2021 09:13)  CBC AND DIFFERENTIAL (01/21/2021 09:18)    Assessment/Plan     Diagnoses and all orders for this visit:    1. Hospital discharge follow-up (Primary)    2. Enteritis  Assessment & Plan:  Symptoms resolved. Pt due to see GI later this month.      3. Heme positive stool  Assessment & Plan:  New problem. Will need colonoscopy      4. Essential hypertension  Assessment & Plan:  Hypertension is  unchanged.  Continue current treatment regimen.  Blood pressure will be reassessed at the next regular appointment.    Orders:  -     lisinopril (PRINIVIL,ZESTRIL) 20 MG tablet; Take 1 tablet by mouth Daily.  Dispense: 90 tablet; Refill: 3    5. Gastroesophageal reflux disease without esophagitis  Assessment & Plan:  The current medical regimen is effective;  continue present plan and medications.  Pt will discuss with GI as well.     Orders:  -     omeprazole (priLOSEC) 40 MG capsule; Take 1 capsule by mouth Daily.  Dispense: 90 capsule; Refill: 3    6. Baker's cyst of knee, right  Assessment & Plan:  New problem.  Pain with running.  Referral to orthopedist.    Orders:  -     Ambulatory Referral to Orthopedic Surgery    7. Overweight (BMI 25.0-29.9)  Assessment & Plan:  Obesity is unchanged.  Discussed the patient's BMI.  The BMI is above average; BMI management plan is completed.  General weight loss/lifestyle modification strategies discussed (elicit support from others; identify saboteurs; non-food rewards, etc).        Follow Up     No follow-ups on file.              Left arm;

## 2022-01-14 DIAGNOSIS — K21.9 GASTROESOPHAGEAL REFLUX DISEASE WITHOUT ESOPHAGITIS: ICD-10-CM

## 2022-01-14 DIAGNOSIS — I10 ESSENTIAL HYPERTENSION: ICD-10-CM

## 2022-01-14 RX ORDER — OMEPRAZOLE 40 MG/1
CAPSULE, DELAYED RELEASE ORAL
Qty: 90 CAPSULE | Refills: 0 | Status: SHIPPED | OUTPATIENT
Start: 2022-01-14 | End: 2022-04-14

## 2022-01-14 RX ORDER — LISINOPRIL 20 MG/1
TABLET ORAL
Qty: 90 TABLET | Refills: 0 | Status: SHIPPED | OUTPATIENT
Start: 2022-01-14 | End: 2022-04-14

## 2022-04-14 DIAGNOSIS — K21.9 GASTROESOPHAGEAL REFLUX DISEASE WITHOUT ESOPHAGITIS: ICD-10-CM

## 2022-04-14 DIAGNOSIS — I10 ESSENTIAL HYPERTENSION: ICD-10-CM

## 2022-04-14 RX ORDER — LISINOPRIL 20 MG/1
TABLET ORAL
Qty: 30 TABLET | Refills: 0 | Status: SHIPPED | OUTPATIENT
Start: 2022-04-14 | End: 2022-05-09

## 2022-04-14 RX ORDER — OMEPRAZOLE 40 MG/1
CAPSULE, DELAYED RELEASE ORAL
Qty: 30 CAPSULE | Refills: 0 | Status: SHIPPED | OUTPATIENT
Start: 2022-04-14 | End: 2022-05-09

## 2022-05-07 DIAGNOSIS — K21.9 GASTROESOPHAGEAL REFLUX DISEASE WITHOUT ESOPHAGITIS: ICD-10-CM

## 2022-05-07 DIAGNOSIS — I10 ESSENTIAL HYPERTENSION: ICD-10-CM

## 2022-05-09 RX ORDER — LISINOPRIL 20 MG/1
TABLET ORAL
Qty: 15 TABLET | Refills: 0 | Status: SHIPPED | OUTPATIENT
Start: 2022-05-09 | End: 2022-07-14 | Stop reason: SDUPTHER

## 2022-05-09 RX ORDER — OMEPRAZOLE 40 MG/1
CAPSULE, DELAYED RELEASE ORAL
Qty: 15 CAPSULE | Refills: 0 | Status: SHIPPED | OUTPATIENT
Start: 2022-05-09 | End: 2022-07-14 | Stop reason: SDUPTHER

## 2022-07-14 ENCOUNTER — OFFICE VISIT (OUTPATIENT)
Dept: FAMILY MEDICINE CLINIC | Facility: CLINIC | Age: 47
End: 2022-07-14

## 2022-07-14 VITALS
SYSTOLIC BLOOD PRESSURE: 132 MMHG | OXYGEN SATURATION: 99 % | WEIGHT: 213 LBS | BODY MASS INDEX: 28.85 KG/M2 | HEIGHT: 72 IN | RESPIRATION RATE: 18 BRPM | HEART RATE: 72 BPM | DIASTOLIC BLOOD PRESSURE: 84 MMHG | TEMPERATURE: 98.5 F

## 2022-07-14 DIAGNOSIS — K21.9 GASTROESOPHAGEAL REFLUX DISEASE WITHOUT ESOPHAGITIS: ICD-10-CM

## 2022-07-14 DIAGNOSIS — I10 ESSENTIAL HYPERTENSION: Primary | ICD-10-CM

## 2022-07-14 DIAGNOSIS — R35.1 NOCTURIA: ICD-10-CM

## 2022-07-14 DIAGNOSIS — Z13.6 SCREENING FOR ISCHEMIC HEART DISEASE: ICD-10-CM

## 2022-07-14 PROCEDURE — 99214 OFFICE O/P EST MOD 30 MIN: CPT | Performed by: FAMILY MEDICINE

## 2022-07-14 RX ORDER — CHLORAL HYDRATE 500 MG
CAPSULE ORAL
COMMUNITY

## 2022-07-14 RX ORDER — MULTIPLE VITAMINS W/ MINERALS TAB 9MG-400MCG
1 TAB ORAL DAILY
COMMUNITY

## 2022-07-14 RX ORDER — OMEPRAZOLE 40 MG/1
40 CAPSULE, DELAYED RELEASE ORAL NIGHTLY
Qty: 90 CAPSULE | Refills: 2 | Status: SHIPPED | OUTPATIENT
Start: 2022-07-14 | End: 2023-03-15 | Stop reason: SDUPTHER

## 2022-07-14 RX ORDER — LISINOPRIL 30 MG/1
30 TABLET ORAL NIGHTLY
Qty: 90 TABLET | Refills: 2 | Status: SHIPPED | OUTPATIENT
Start: 2022-07-14 | End: 2023-03-15 | Stop reason: SDUPTHER

## 2022-07-14 NOTE — PROGRESS NOTES
"Chief Complaint  Hypertension (Med check )    Subjective     {Problem List  Visit Diagnosis  Review (Popup)  Encounters  Notes  Medications  Labs  Result Review Imaging  Media :23}     Slava presents to [facility]  To refill medicines.  Blood pressure is unchanged.  Certainly our goal for blood pressure control will be in the 120s.  He does not have symptoms from his current blood pressure medicine.        Objective   Vital Signs:   Vitals:    07/14/22 1054   BP: 132/84   Pulse: 72   Resp: 18   Temp: 98.5 °F (36.9 °C)   SpO2: 99%   Weight: 96.6 kg (213 lb)   Height: 182.9 cm (72\")        BMI is >= 25 and <30. (Overweight) The following options were offered after discussion;: weight loss educational material (shared in after visit summary), exercise counseling/recommendations and nutrition counseling/recommendations        Physical Exam  Vitals reviewed.   Constitutional:       General: He is not in acute distress.  Eyes:      General: Lids are normal.      Conjunctiva/sclera: Conjunctivae normal.   Neck:      Vascular: No carotid bruit.      Trachea: No tracheal deviation.   Cardiovascular:      Rate and Rhythm: Normal rate and regular rhythm.      Heart sounds: Normal heart sounds. No murmur heard.  Pulmonary:      Effort: Pulmonary effort is normal.      Breath sounds: Normal breath sounds.   Skin:     General: Skin is warm and dry.   Neurological:      Mental Status: He is alert. He is not disoriented.   Psychiatric:         Speech: Speech normal.         Behavior: Behavior normal. Behavior is cooperative.          Result Review :                Assessment and Plan    Diagnoses and all orders for this visit:    1. Essential hypertension (Primary)  Assessment & Plan:  We will increase lisinopril to 30 mg at bedtime dosing.  Patient will obtain twice daily blood pressure readings for up to a month after being on the new dosing for 2 to 3 weeks.  He will send in those results " to me through [employer] documentation.  Order has been created.  DASH diet also shared and after visit summary.  He will continue with exercise with intent for continued weight loss.    Orders:  -     [employer] BP Flowsheet  -     lisinopril (PRINIVIL,ZESTRIL) 30 MG tablet; Take 1 tablet by mouth Every Night for 270 days.  Dispense: 90 tablet; Refill: 2  -     Comprehensive Metabolic Panel; Future  -     CBC & Differential; Future    2. Gastroesophageal reflux disease without esophagitis  Assessment & Plan:  GERD symptoms controlled.  Changed to bedtime dosing for better efficacy.    Orders:  -     omeprazole (priLOSEC) 40 MG capsule; Take 1 capsule by mouth Every Night for 270 days.  Dispense: 90 capsule; Refill: 2    3. Screening for ischemic heart disease    4. Nocturia  -     Comprehensive Metabolic Panel; Future  -     CBC & Differential; Future  -     Lipid Panel With LDL / HDL Ratio; Future  -     PSA DIAGNOSTIC; Future      Follow Up   Return in about 8 months (around 3/14/2023) for recheck/refill medication.  Patient was given instructions and counseling regarding his condition or for health maintenance advice. Please see specific information pulled into the AVS if appropriate.

## 2022-07-14 NOTE — ASSESSMENT & PLAN NOTE
We will increase lisinopril to 30 mg at bedtime dosing.  Patient will obtain twice daily blood pressure readings for up to a month after being on the new dosing for 2 to 3 weeks.  He will send in those results to me through [employer] documentation.  Order has been created.  DASH diet also shared and after visit summary.  He will continue with exercise with intent for continued weight loss.

## 2022-07-14 NOTE — PATIENT INSTRUCTIONS
"https://www.nhlbi.nih.gov/files/docs/public/heart/dash_brief.pdf\">   DASH Eating Plan  DASH stands for Dietary Approaches to Stop Hypertension. The DASH eating plan is a healthy eating plan that has been shown to:  Reduce high blood pressure (hypertension).  Reduce your risk for type 2 diabetes, heart disease, and stroke.  Help with weight loss.  What are tips for following this plan?  Reading food labels  Check food labels for the amount of salt (sodium) per serving. Choose foods with less than 5 percent of the Daily Value of sodium. Generally, foods with less than 300 milligrams (mg) of sodium per serving fit into this eating plan.  To find whole grains, look for the word \"whole\" as the first word in the ingredient list.  Shopping  Buy products labeled as \"low-sodium\" or \"no salt added.\"  Buy fresh foods. Avoid canned foods and pre-made or frozen meals.  Cooking  Avoid adding salt when cooking. Use salt-free seasonings or herbs instead of table salt or sea salt. Check with your health care provider or pharmacist before using salt substitutes.  Do not mason foods. Cook foods using healthy methods such as baking, boiling, grilling, roasting, and broiling instead.  Cook with heart-healthy oils, such as olive, canola, avocado, soybean, or sunflower oil.  Meal planning    Eat a balanced diet that includes:  4 or more servings of fruits and 4 or more servings of vegetables each day. Try to fill one-half of your plate with fruits and vegetables.  6-8 servings of whole grains each day.  Less than 6 oz (170 g) of lean meat, poultry, or fish each day. A 3-oz (85-g) serving of meat is about the same size as a deck of cards. One egg equals 1 oz (28 g).  2-3 servings of low-fat dairy each day. One serving is 1 cup (237 mL).  1 serving of nuts, seeds, or beans 5 times each week.  2-3 servings of heart-healthy fats. Healthy fats called omega-3 fatty acids are found in foods such as walnuts, flaxseeds, fortified milks, and eggs. " These fats are also found in cold-water fish, such as sardines, salmon, and mackerel.  Limit how much you eat of:  Canned or prepackaged foods.  Food that is high in trans fat, such as some fried foods.  Food that is high in saturated fat, such as fatty meat.  Desserts and other sweets, sugary drinks, and other foods with added sugar.  Full-fat dairy products.  Do not salt foods before eating.  Do not eat more than 4 egg yolks a week.  Try to eat at least 2 vegetarian meals a week.  Eat more home-cooked food and less restaurant, buffet, and fast food.    Lifestyle  When eating at a restaurant, ask that your food be prepared with less salt or no salt, if possible.  If you drink alcohol:  Limit how much you use to:  0-1 drink a day for women who are not pregnant.  0-2 drinks a day for men.  Be aware of how much alcohol is in your drink. In the [place] one drink equals one 12 oz bottle of beer (355 mL), one 5 oz glass of wine (148 mL), or one 1½ oz glass of hard liquor (44 mL).  General information  Avoid eating more than 2,300 mg of salt a day. If you have hypertension, you may need to reduce your sodium intake to 1,500 mg a day.  Work with your health care provider to maintain a healthy body weight or to lose weight. Ask what an ideal weight is for you.  Get at least 30 minutes of exercise that causes your heart to beat faster (aerobic exercise) most days of the week. Activities may include walking, swimming, or biking.  Work with your health care provider or dietitian to adjust your eating plan to your individual calorie needs.  What foods should I eat?  Fruits  All fresh, dried, or frozen fruit. Canned fruit in natural juice (without added sugar).  Vegetables  Fresh or frozen vegetables (raw, steamed, roasted, or grilled). Low-sodium or reduced-sodium tomato and vegetable juice. Low-sodium or reduced-sodium tomato sauce and tomato paste. Low-sodium or reduced-sodium canned vegetables.  Grains  Whole-grain or  whole-wheat bread. Whole-grain or whole-wheat pasta. Brown rice. Oatmeal. Quinoa. Bulgur. Whole-grain and low-sodium cereals. Monica bread. Low-fat, low-sodium crackers. Whole-wheat flour tortillas.  Meats and other proteins  Skinless chicken or turkey. Ground chicken or turkey. Pork with fat trimmed off. Fish and seafood. Egg whites. Dried beans, peas, or lentils. Unsalted nuts, nut butters, and seeds. Unsalted canned beans. Lean cuts of beef with fat trimmed off. Low-sodium, lean precooked or cured meat, such as sausages or meat loaves.  Dairy  Low-fat (1%) or fat-free (skim) milk. Reduced-fat, low-fat, or fat-free cheeses. Nonfat, low-sodium ricotta or cottage cheese. Low-fat or nonfat yogurt. Low-fat, low-sodium cheese.  Fats and oils  Soft margarine without trans fats. Vegetable oil. Reduced-fat, low-fat, or light mayonnaise and salad dressings (reduced-sodium). Canola, safflower, olive, avocado, soybean, and sunflower oils. Avocado.  Seasonings and condiments  Herbs. Spices. Seasoning mixes without salt.  Other foods  Unsalted popcorn and pretzels. Fat-free sweets.  The items listed above may not be a complete list of foods and beverages you can eat. Contact a dietitian for more information.  What foods should I avoid?  Fruits  Canned fruit in a light or heavy syrup. Fried fruit. Fruit in cream or butter sauce.  Vegetables  Creamed or fried vegetables. Vegetables in a cheese sauce. Regular canned vegetables (not low-sodium or reduced-sodium). Regular canned tomato sauce and paste (not low-sodium or reduced-sodium). Regular tomato and vegetable juice (not low-sodium or reduced-sodium). Pickles. Olives.  Grains  Baked goods made with fat, such as croissants, muffins, or some breads. Dry pasta or rice meal packs.  Meats and other proteins  Fatty cuts of meat. Ribs. Fried meat. Cunningham. Bologna, salami, and other precooked or cured meats, such as sausages or meat loaves. Fat from the back of a pig (fatback).  Bratwurst. Salted nuts and seeds. Canned beans with added salt. Canned or smoked fish. Whole eggs or egg yolks. Chicken or turkey with skin.  Dairy  Whole or 2% milk, cream, and half-and-half. Whole or full-fat cream cheese. Whole-fat or sweetened yogurt. Full-fat cheese. Nondairy creamers. Whipped toppings. Processed cheese and cheese spreads.  Fats and oils  Butter. Stick margarine. Lard. Shortening. Ghee. Cunningham fat. Tropical oils, such as coconut, palm kernel, or palm oil.  Seasonings and condiments  Onion salt, garlic salt, seasoned salt, table salt, and sea salt. Worcestershire sauce. Tartar sauce. Barbecue sauce. Teriyaki sauce. Soy sauce, including reduced-sodium. Steak sauce. Canned and packaged gravies. Fish sauce. Oyster sauce. Cocktail sauce. Store-bought horseradish. Ketchup. Mustard. Meat flavorings and tenderizers. Bouillon cubes. Hot sauces. Pre-made or packaged marinades. Pre-made or packaged taco seasonings. Relishes. Regular salad dressings.  Other foods  Salted popcorn and pretzels.  The items listed above may not be a complete list of foods and beverages you should avoid. Contact a dietitian for more information.  Where to find more information  National Heart, Lung, and Blood [place]: www.nhlbi.nih.gov  American Heart Association: www.heart.org  Academy of Nutrition and Dietetics: www.eatright.org  National Kidney Foundation: www.kidney.org  Summary  The DASH eating plan is a healthy eating plan that has been shown to reduce high blood pressure (hypertension). It may also reduce your risk for type 2 diabetes, heart disease, and stroke.  When on the DASH eating plan, aim to eat more fresh fruits and vegetables, whole grains, lean proteins, low-fat dairy, and heart-healthy fats.  With the DASH eating plan, you should limit salt (sodium) intake to 2,300 mg a day. If you have hypertension, you may need to reduce your sodium intake to 1,500 mg a day.  Work with your health care provider or  dietitian to adjust your eating plan to your individual calorie needs.  This information is not intended to replace advice given to you by your health care provider. Make sure you discuss any questions you have with your health care provider.  Document Revised: 11/20/2020 Document Reviewed: 11/20/2020  Elsevier Patient Education © 2021 Elsevier Inc.

## 2023-03-15 ENCOUNTER — OFFICE VISIT (OUTPATIENT)
Dept: FAMILY MEDICINE CLINIC | Facility: CLINIC | Age: 48
End: 2023-03-15
Payer: COMMERCIAL

## 2023-03-15 VITALS
OXYGEN SATURATION: 99 % | WEIGHT: 184 LBS | HEART RATE: 65 BPM | BODY MASS INDEX: 24.92 KG/M2 | HEIGHT: 72 IN | SYSTOLIC BLOOD PRESSURE: 130 MMHG | DIASTOLIC BLOOD PRESSURE: 83 MMHG | RESPIRATION RATE: 16 BRPM

## 2023-03-15 DIAGNOSIS — K21.9 GASTROESOPHAGEAL REFLUX DISEASE, UNSPECIFIED WHETHER ESOPHAGITIS PRESENT: ICD-10-CM

## 2023-03-15 DIAGNOSIS — I10 ESSENTIAL HYPERTENSION: Primary | ICD-10-CM

## 2023-03-15 DIAGNOSIS — K21.9 GASTROESOPHAGEAL REFLUX DISEASE WITHOUT ESOPHAGITIS: ICD-10-CM

## 2023-03-15 DIAGNOSIS — R35.1 NOCTURIA: ICD-10-CM

## 2023-03-15 DIAGNOSIS — Z11.59 NEED FOR HEPATITIS C SCREENING TEST: ICD-10-CM

## 2023-03-15 DIAGNOSIS — Z13.6 SCREENING FOR ISCHEMIC HEART DISEASE: ICD-10-CM

## 2023-03-15 DIAGNOSIS — K22.2 SCHATZKI'S RING OF DISTAL ESOPHAGUS: ICD-10-CM

## 2023-03-15 PROBLEM — R19.5 HEME POSITIVE STOOL: Status: RESOLVED | Noted: 2021-01-21 | Resolved: 2023-03-15

## 2023-03-15 PROBLEM — K52.9 ENTERITIS: Status: RESOLVED | Noted: 2021-01-21 | Resolved: 2023-03-15

## 2023-03-15 PROBLEM — E66.3 OVERWEIGHT (BMI 25.0-29.9): Status: RESOLVED | Noted: 2020-01-08 | Resolved: 2023-03-15

## 2023-03-15 PROBLEM — Z12.11 ENCOUNTER FOR SCREENING COLONOSCOPY: Status: RESOLVED | Noted: 2021-02-18 | Resolved: 2023-03-15

## 2023-03-15 PROCEDURE — 99214 OFFICE O/P EST MOD 30 MIN: CPT | Performed by: FAMILY MEDICINE

## 2023-03-15 RX ORDER — OMEPRAZOLE 40 MG/1
40 CAPSULE, DELAYED RELEASE ORAL NIGHTLY
Qty: 90 CAPSULE | Refills: 3 | Status: SHIPPED | OUTPATIENT
Start: 2023-03-15 | End: 2024-03-14

## 2023-03-15 RX ORDER — LISINOPRIL 30 MG/1
30 TABLET ORAL NIGHTLY
Qty: 90 TABLET | Refills: 3 | Status: SHIPPED | OUTPATIENT
Start: 2023-03-15 | End: 2024-03-14

## 2023-03-15 NOTE — PROGRESS NOTES
"Chief Complaint  Hypertension    Samm Pedersen presents to [facility] for medication refills. Overall he feels well. Good medication compliance is reported. No medication side effects are reported.            Objective   Vital Signs:   Vitals:    03/15/23 0753   BP: 130/83   Pulse: 65   Resp: 16   SpO2: 99%   Weight: 83.5 kg (184 lb)   Height: 182.9 cm (72\")        BMI is within normal parameters. No other follow-up for BMI required.        Physical Exam  Vitals reviewed.   Constitutional:       General: He is not in acute distress.  Eyes:      General: Lids are normal.      Conjunctiva/sclera: Conjunctivae normal.   Neck:      Vascular: No carotid bruit.      Trachea: No tracheal deviation.   Cardiovascular:      Rate and Rhythm: Normal rate and regular rhythm.      Heart sounds: Normal heart sounds. No murmur heard.  Pulmonary:      Effort: Pulmonary effort is normal.      Breath sounds: Normal breath sounds.   Skin:     General: Skin is warm and dry.   Neurological:      Mental Status: He is alert. He is not disoriented.   Psychiatric:         Speech: Speech normal.         Behavior: Behavior normal. Behavior is cooperative.          Result Review :                Assessment and Plan    Diagnoses and all orders for this visit:    1. Essential hypertension (Primary)  Assessment & Plan:  Hypertension is improving with treatment.  Continue current treatment regimen.  Blood pressure will be reassessed at the next regular appointment.    Orders:  -     lisinopril (PRINIVIL,ZESTRIL) 30 MG tablet; Take 1 tablet by mouth Every Night.  Dispense: 90 tablet; Refill: 3  -     Comprehensive Metabolic Panel; Future  -     CBC & Differential; Future  -     TSH; Future    2. Gastroesophageal reflux disease without esophagitis  Assessment & Plan:  The current medical regimen is effective;  continue present plan and medications.      Orders:  -     omeprazole (priLOSEC) 40 MG capsule; " Take 1 capsule by mouth Every Night.  Dispense: 90 capsule; Refill: 3    3. Gastroesophageal reflux disease, unspecified whether esophagitis present  Assessment & Plan:  The current medical regimen is effective;  continue present plan and medications.        4. Schatzki's ring of distal esophagus  Assessment & Plan:  Continue with proton pump inhibitor.      5. Screening for ischemic heart disease  -     Lipid Panel With / Chol / HDL Ratio; Future  -     CK; Future    6. Need for hepatitis C screening test  -     Hepatitis C Antibody; Future    7. Nocturia  -     PSA DIAGNOSTIC; Future      Follow Up   Return in about 1 year (around 3/15/2024) for Adult wellness & medication appt, schedule 30 min.  Patient was given instructions and counseling regarding his condition or for health maintenance advice. Please see specific information pulled into the AVS if appropriate.

## 2023-03-30 DIAGNOSIS — R79.89 ELEVATED LFTS: ICD-10-CM

## 2023-03-30 DIAGNOSIS — E78.49 OTHER HYPERLIPIDEMIA: Primary | ICD-10-CM

## 2023-03-30 RX ORDER — ROSUVASTATIN CALCIUM 5 MG/1
5 TABLET, COATED ORAL NIGHTLY
Qty: 90 TABLET | Refills: 1 | Status: SHIPPED | OUTPATIENT
Start: 2023-03-30 | End: 2023-09-26

## 2023-06-26 PROBLEM — E78.49 OTHER HYPERLIPIDEMIA: Status: ACTIVE | Noted: 2023-06-26

## 2024-01-22 DIAGNOSIS — K21.9 GASTROESOPHAGEAL REFLUX DISEASE WITHOUT ESOPHAGITIS: ICD-10-CM

## 2024-01-22 RX ORDER — OMEPRAZOLE 40 MG/1
40 CAPSULE, DELAYED RELEASE ORAL NIGHTLY
Qty: 90 CAPSULE | Refills: 0 | Status: SHIPPED | OUTPATIENT
Start: 2024-01-22

## 2024-01-22 NOTE — TELEPHONE ENCOUNTER
Rx Refill Note  Requested Prescriptions     Pending Prescriptions Disp Refills    omeprazole (priLOSEC) 40 MG capsule [Pharmacy Med Name: OMEPRAZOLE CAP 40MG] 90 capsule 2     Sig: TAKE 1 CAPSULE EVERY NIGHT      Last office visit with prescribing clinician: 6/26/2023   Last telemedicine visit with prescribing clinician: Visit date not found   Next office visit with prescribing clinician: 3/18/2024                         Would you like a call back once the refill request has been completed: [] Yes [] No    If the office needs to give you a call back, can they leave a voicemail: [] Yes [] No    aLvell Johnston MA  01/22/24, 10:55 EST

## 2024-02-20 ENCOUNTER — TELEPHONE (OUTPATIENT)
Dept: FAMILY MEDICINE CLINIC | Facility: CLINIC | Age: 49
End: 2024-02-20
Payer: COMMERCIAL

## 2024-02-20 NOTE — TELEPHONE ENCOUNTER
"Hub staff attempted to follow warm transfer process and was unsuccessful     Caller: Dg Harden \"Slava\"    Relationship to patient: Self    Best call back number: [phone]     Patient is needing: PATIENT IS REQUESTING TO SCHEDULE LABS BEFORE HIS PHYSICAL ON -2024.    PLEASE CALL TO SCHEDULE.    "

## 2024-02-26 ENCOUNTER — TELEPHONE (OUTPATIENT)
Dept: FAMILY MEDICINE CLINIC | Facility: CLINIC | Age: 49
End: 2024-02-26
Payer: COMMERCIAL

## 2024-02-26 NOTE — TELEPHONE ENCOUNTER
"Caller: Dg Harden \"Slava\"    Relationship to patient: Self    Best call back number: [phone]    Type of visit: LABS    Requested date: 02/29/2024     Additional notes: PATIENT IS REQUESTING A CALL BACK TO GET SCHEDULED.       "

## 2024-02-28 NOTE — TELEPHONE ENCOUNTER
"  Hub staff attempted to follow warm transfer process and was unsuccessful     Caller: Dg Harden \"Slava\"    Relationship to patient: Self    Best call back number: [phone]    Patient is needing: CALLING BACK TO SCHEDULE LABS. PLEASE GIVE HIM A CALL BACK TO SCHEDULE.         "

## 2024-03-18 ENCOUNTER — OFFICE VISIT (OUTPATIENT)
Dept: FAMILY MEDICINE CLINIC | Facility: CLINIC | Age: 49
End: 2024-03-18
Payer: COMMERCIAL

## 2024-03-18 VITALS
HEIGHT: 72 IN | HEART RATE: 63 BPM | OXYGEN SATURATION: 98 % | DIASTOLIC BLOOD PRESSURE: 78 MMHG | BODY MASS INDEX: 25.87 KG/M2 | SYSTOLIC BLOOD PRESSURE: 130 MMHG | WEIGHT: 191 LBS

## 2024-03-18 DIAGNOSIS — Z13.6 SCREENING FOR ISCHEMIC HEART DISEASE: ICD-10-CM

## 2024-03-18 DIAGNOSIS — Z23 IMMUNIZATION DUE: ICD-10-CM

## 2024-03-18 DIAGNOSIS — K21.9 GASTROESOPHAGEAL REFLUX DISEASE WITHOUT ESOPHAGITIS: ICD-10-CM

## 2024-03-18 DIAGNOSIS — Z00.00 ENCOUNTER FOR WELLNESS EXAMINATION IN ADULT: Primary | ICD-10-CM

## 2024-03-18 DIAGNOSIS — I10 ESSENTIAL HYPERTENSION: ICD-10-CM

## 2024-03-18 DIAGNOSIS — E78.49 OTHER HYPERLIPIDEMIA: ICD-10-CM

## 2024-03-18 RX ORDER — LISINOPRIL 30 MG/1
30 TABLET ORAL NIGHTLY
Qty: 90 TABLET | Refills: 4 | Status: SHIPPED | OUTPATIENT
Start: 2024-03-18 | End: 2025-06-11

## 2024-03-18 RX ORDER — ROSUVASTATIN CALCIUM 5 MG/1
5 TABLET, COATED ORAL NIGHTLY
Qty: 90 TABLET | Refills: 4 | Status: SHIPPED | OUTPATIENT
Start: 2024-03-18 | End: 2025-06-11

## 2024-03-18 RX ORDER — OMEPRAZOLE 40 MG/1
40 CAPSULE, DELAYED RELEASE ORAL NIGHTLY
Qty: 90 CAPSULE | Refills: 4 | Status: SHIPPED | OUTPATIENT
Start: 2024-03-18 | End: 2025-06-11

## 2024-03-18 NOTE — PATIENT INSTRUCTIONS
Please call [facility] at [phone] to schedule your Coronary Calcium CT test ordered by Dr. Man. Address is [address] [place] [address]    Coronary Calcium Scan  A coronary calcium scan is an imaging test used to look for deposits of plaque in the inner lining of the blood vessels of the heart (coronary arteries). Plaque is made up of calcium, protein, and fatty substances. These deposits of plaque can partly clog and narrow the coronary arteries without producing any symptoms or warning signs. This puts a person at risk for a heart attack.  This test is recommended for people who are at moderate risk for heart disease. The test can find plaque deposits before symptoms develop.  Tell a health care provider about:  Any allergies you have.  All medicines you are taking, including vitamins, herbs, eye drops, creams, and over-the-counter medicines.  Any problems you or family members have had with anesthetic medicines.  Any blood disorders you have.  Any surgeries you have had.  Any medical conditions you have.  Whether you are pregnant or may be pregnant.  What are the risks?  Generally, this is a safe procedure. However, problems may occur, including:  Harm to a pregnant woman and her unborn baby. This test involves the use of radiation. Radiation exposure can be dangerous to a pregnant woman and her unborn baby. If you are pregnant or think you may be pregnant, you should not have this procedure done.  Slight increase in the risk of cancer. This is because of the radiation involved in the test.  What happens before the procedure?  Ask your health care provider for any specific instructions on how to prepare for this procedure. You may be asked to avoid products that contain caffeine, tobacco, or nicotine for 4 hours before the procedure.  What happens during the procedure?    You will undress and remove any jewelry from your neck or chest.  You will put on a hospital gown.  Sticky electrodes will  be placed on your chest. The electrodes will be connected to an electrocardiogram (ECG) machine to record a tracing of the electrical activity of your heart.  You will lie down on a curved bed that is attached to the CT scanner.  You may be given medicine to slow down your heart rate so that clear pictures can be created.  You will be moved into the CT scanner, and the CT scanner will take pictures of your heart. During this time, you will be asked to lie still and hold your breath for 2-3 seconds at a time while each picture of your heart is being taken.  The procedure may vary among health care providers and hospitals.  What happens after the procedure?  You can get dressed.  You can return to your normal activities.  It is up to you to get the results of your procedure. Ask your health care provider, or the department that is doing the procedure, when your results will be ready.  Summary  A coronary calcium scan is an imaging test used to look for deposits of plaque in the inner lining of the blood vessels of the heart (coronary arteries). Plaque is made up of calcium, protein, and fatty substances.  Generally, this is a safe procedure. Tell your health care provider if you are pregnant or may be pregnant.  Ask your health care provider for any specific instructions on how to prepare for this procedure.  A CT scanner will take pictures of your heart.  You can return to your normal activities after the scan is done.  This information is not intended to replace advice given to you by your health care provider. Make sure you discuss any questions you have with your health care provider.  Document Revised: 07/07/2020 Document Reviewed: 07/07/2020  [employer] Patient Education © 2021 [employer]     Exercising to Stay Healthy  To become healthy and stay healthy, it is recommended that you do moderate-intensity and vigorous-intensity exercise. You can tell that you are exercising at a moderate intensity if your heart  starts beating faster and you start breathing faster but can still hold a conversation. You can tell that you are exercising at a vigorous intensity if you are breathing much harder and faster and cannot hold a conversation while exercising.  How can exercise benefit me?  Exercising regularly is important. It has many health benefits, such as:  Improving overall fitness, flexibility, and endurance.  Increasing bone density.  Helping with weight control.  Decreasing body fat.  Increasing muscle strength and endurance.  Reducing stress and tension, anxiety, depression, or anger.  Improving overall health.  What guidelines should I follow while exercising?  Before you start a new exercise program, talk with your health care provider.  Do not exercise so much that you hurt yourself, feel dizzy, or get very short of breath.  Wear comfortable clothes and wear shoes with good support.  Drink plenty of water while you exercise to prevent dehydration or heat stroke.  Work out until your breathing and your heartbeat get faster (moderate intensity).  How often should I exercise?  Choose an activity that you enjoy, and set realistic goals. Your health care provider can help you make an activity plan that is individually designed and works best for you.  Exercise regularly as told by your health care provider. This may include:  Doing strength training two times a week, such as:  Lifting weights.  Using resistance bands.  Push-ups.  Sit-ups.  Yoga.  Doing a certain intensity of exercise for a given amount of time. Choose from these options:  A total of 150 minutes of moderate-intensity exercise every week.  A total of 75 minutes of vigorous-intensity exercise every week.  A mix of moderate-intensity and vigorous-intensity exercise every week.  Children, pregnant women, people who have not exercised regularly, people who are overweight, and older adults may need to talk with a health care provider about what activities are safe  to perform. If you have a medical condition, be sure to talk with your health care provider before you start a new exercise program.  What are some exercise ideas?  Moderate-intensity exercise ideas include:  Walking 1 mile (1.6 km) in about 15 minutes.  Biking.  Hiking.  Golfing.  Dancing.  Water aerobics.  Vigorous-intensity exercise ideas include:  Walking 4.5 miles (7.2 km) or more in about 1 hour.  Jogging or running 5 miles (8 km) in about 1 hour.  Biking 10 miles (16.1 km) or more in about 1 hour.  Lap swimming.  Roller-skating or in-line skating.  Cross-country skiing.  Vigorous competitive sports, such as football, basketball, and soccer.  Jumping rope.  Aerobic dancing.  What are some everyday activities that can help me get exercise?  Yard work, such as:  Pushing a .  Raking and bagging leaves.  Washing your car.  Pushing a stroller.  Shoveling snow.  Gardening.  Washing windows or floors.  How can I be more active in my day-to-day activities?  Use stairs instead of an elevator.  Take a walk during your lunch break.  If you drive, park your car farther away from your work or school.  If you take public transportation, get off one stop early and walk the rest of the way.  Stand up or walk around during all of your indoor phone calls.  Get up, stretch, and walk around every 30 minutes throughout the day.  Enjoy exercise with a friend. Support to continue exercising will help you keep a regular routine of activity.  Where to find more information  You can find more information about exercising to stay healthy from:  [place] Department of Health and Human Services: www.hhs.gov  Centers for Disease Control and Prevention (CDC): www.cdc.gov  Summary  Exercising regularly is important. It will improve your overall fitness, flexibility, and endurance.  Regular exercise will also improve your overall health. It can help you control your weight, reduce stress, and improve your bone density.  Do not exercise  "so much that you hurt yourself, feel dizzy, or get very short of breath.  Before you start a new exercise program, talk with your health care provider.  This information is not intended to replace advice given to you by your health care provider. Make sure you discuss any questions you have with your health care provider.  Document Revised: 04/15/2022 Document Reviewed: 04/15/2022  [employer] Patient Education © 2023 [employer]  Fat and Cholesterol Restricted Diet  Getting too much fat and cholesterol in your diet may cause health problems. Following this diet helps keep your fat and cholesterol at normal levels. This can keep you from getting sick.  WHAT TYPES OF FAT SHOULD I CHOOSE?  Choose monosaturated and polyunsaturated fats. These are found in foods such as olive oil, canola oil, flaxseeds, walnuts, almonds, and seeds.  Eat more omega-3 fats. Good choices include salmon, mackerel, sardines, tuna, flaxseed oil, and ground flaxseeds.  Limit saturated fats. These are in animal products such as meats, butter, and cream. They can also be in plant products such as palm oil, palm kernel oil, and coconut oil.      Avoid foods with partially hydrogenated oils in them. These contain trans fats. Examples of foods that have trans fats are stick margarine, some tub margarines, cookies, crackers, and other baked goods.  WHAT GENERAL GUIDELINES DO I NEED TO FOLLOW?    Check food labels. Look for the words \"trans fat\" and \"saturated fat.\"  When preparing a meal:  Fill half of your plate with vegetables and green salads.  Fill one fourth of your plate with whole grains. Look for the word \"whole\" as the first word in the ingredient list.  Fill one fourth of your plate with lean protein foods.  Limit fruit to two servings a day. Choose fruit instead of juice.  Eat more foods with soluble fiber. Examples of foods with this type of fiber are apples, broccoli, carrots, beans, peas, and barley. Try to get 20-30 g (grams) of fiber " "per day.  Eat more home-cooked foods. Eat less at restaurants and buffets.  Limit or avoid alcohol.  Limit foods high in starch and sugar.Fat and Cholesterol Restricted Eating Plan  Getting too much fat and cholesterol in your diet may cause health problems. Choosing the right foods helps keep your fat and cholesterol at normal levels. This can keep you from getting certain diseases.  Your doctor may recommend an eating plan that includes:  Total fat: ______% or less of total calories a day.  Saturated fat: ______% or less of total calories a day.  Cholesterol: less than _________mg a day.  Fiber: ______g a day.  What are tips for following this plan?  Meal planning  At meals, divide your plate into four equal parts:  Fill one-half of your plate with vegetables and green salads.  Fill one-fourth of your plate with whole grains.  Fill one-fourth of your plate with low-fat (lean) protein foods.  Eat fish that is high in omega-3 fats at least two times a week. This includes mackerel, tuna, sardines, and salmon.  Eat foods that are high in fiber, such as whole grains, beans, apples, broccoli, carrots, peas, and barley.  General tips    Work with your doctor to lose weight if you need to.  Avoid:  Foods with added sugar.  Fried foods.  Foods with partially hydrogenated oils.  Limit alcohol intake to no more than 1 drink a day for nonpregnant women and 2 drinks a day for men. One drink equals 12 oz of beer, 5 oz of wine, or 1½ oz of hard liquor.    Reading food labels  Check food labels for:  Trans fats.  Partially hydrogenated oils.  Saturated fat (g) in each serving.  Cholesterol (mg) in each serving.  Fiber (g) in each serving.  Choose foods with healthy fats, such as:  Monounsaturated fats.  Polyunsaturated fats.  Omega-3 fats.  Choose grain products that have whole grains. Look for the word \"whole\" as the first word in the ingredient list.  Cooking  Cook foods using low-fat methods. These include baking, boiling, " grilling, and broiling.  Eat more home-cooked foods. Eat at restaurants and buffets less often.  Avoid cooking using saturated fats, such as butter, cream, palm oil, palm kernel oil, and coconut oil.  Recommended foods    Fruits  All fresh, canned (in natural juice), or frozen fruits.  Vegetables  Fresh or frozen vegetables (raw, steamed, roasted, or grilled). Green salads.  Grains  Whole grains, such as whole wheat or whole grain breads, crackers, cereals, and pasta. Unsweetened oatmeal, bulgur, barley, quinoa, or brown rice. Corn or whole wheat flour tortillas.  Meats and other protein foods  Ground beef (85% or leaner), grass-fed beef, or beef trimmed of fat. Skinless chicken or turkey. Ground chicken or turkey. Pork trimmed of fat. All fish and seafood. Egg whites. Dried beans, peas, or lentils. Unsalted nuts or seeds. Unsalted canned beans. Nut butters without added sugar or oil.  Dairy  Low-fat or nonfat dairy products, such as skim or 1% milk, 2% or reduced-fat cheeses, low-fat and fat-free ricotta or cottage cheese, or plain low-fat and nonfat yogurt.  Fats and oils  Tub margarine without trans fats. Light or reduced-fat mayonnaise and salad dressings. Avocado. Olive, canola, sesame, or safflower oils.  The items listed above may not be a complete list of foods and beverages you can eat. Contact a dietitian for more information.  Foods to avoid  Fruits  Canned fruit in heavy syrup. Fruit in cream or butter sauce. Fried fruit.  Vegetables  Vegetables cooked in cheese, cream, or butter sauce. Fried vegetables.  Grains  White bread. White pasta. White rice. Cornbread. Bagels, pastries, and croissants. Crackers and snack foods that contain trans fat and hydrogenated oils.  Meats and other protein foods  Fatty cuts of meat. Ribs, chicken wings, stephenson, sausage, bologna, salami, chitterlings, fatback, hot dogs, bratwurst, and packaged lunch meats. Liver and organ meats. Whole eggs and egg yolks. Chicken and turkey  with skin. Fried meat.  Dairy  Whole or 2% milk, cream, half-and-half, and cream cheese. Whole milk cheeses. Whole-fat or sweetened yogurt. Full-fat cheeses. Nondairy creamers and whipped toppings. Processed cheese, cheese spreads, and cheese curds.  Beverages  Alcohol. Sugar-sweetened drinks such as sodas, lemonade, and fruit drinks.  Fats and oils  Butter, stick margarine, lard, shortening, ghee, or stephenson fat. Coconut, palm kernel, and palm oils.  Sweets and desserts  Corn syrup, sugars, honey, and molasses. Candy. Jam and jelly. Syrup. Sweetened cereals. Cookies, pies, cakes, donuts, muffins, and ice cream.  The items listed above may not be a complete list of foods and beverages you should avoid. Contact a dietitian for more information.  Summary  Choosing the right foods helps keep your fat and cholesterol at normal levels. This can keep you from getting certain diseases.  At meals, fill one-half of your plate with vegetables and green salads.  Eat high-fiber foods, like whole grains, beans, apples, carrots, peas, and barley.  Limit added sugar, saturated fats, alcohol, and fried foods.  This information is not intended to replace advice given to you by your health care provider. Make sure you discuss any questions you have with your health care provider.  Document Revised: 04/21/2021 Document Reviewed: 04/21/2021  [employer] Patient Education © 2021 [employer]    Limit fried foods.  Cook foods without frying them. Baking, boiling, grilling, and broiling are all great options.  Lose weight if you are overweight. Losing even a small amount of weight can help your overall health. It can also help prevent diseases such as diabetes and heart disease.  WHAT FOODS CAN I EAT?  Grains  Whole grains, such as whole wheat or whole grain breads, crackers, cereals, and pasta. Unsweetened oatmeal, bulgur, barley, quinoa, or brown rice. Corn or whole wheat flour tortillas.  Vegetables  Fresh or frozen vegetables (raw,  steamed, roasted, or grilled). Green salads.  Fruits  All fresh, canned (in natural juice), or frozen fruits.  Meat and Other Protein Products  Ground beef (85% or leaner), grass-fed beef, or beef trimmed of fat. Skinless chicken or turkey. Ground chicken or turkey. Pork trimmed of fat. All fish and seafood. Eggs. Dried beans, peas, or lentils. Unsalted nuts or seeds. Unsalted canned or dry beans.  Dairy  Low-fat dairy products, such as skim or 1% milk, 2% or reduced-fat cheeses, low-fat ricotta or cottage cheese, or plain low-fat yogurt.  Fats and Oils  Tub margarines without trans fats. Light or reduced-fat mayonnaise and salad dressings. Avocado. Olive, canola, sesame, or safflower oils. Natural peanut or almond butter (choose ones without added sugar and oil).  The items listed above may not be a complete list of recommended foods or beverages. Contact your dietitian for more options.  WHAT FOODS ARE NOT RECOMMENDED?  Grains  White bread. White pasta. White rice. Cornbread. Bagels, pastries, and croissants. Crackers that contain trans fat.  Vegetables  White potatoes. Corn. Creamed or fried vegetables. Vegetables in a cheese sauce.  Fruits  Dried fruits. Canned fruit in light or heavy syrup. Fruit juice.  Meat and Other Protein Products  Fatty cuts of meat. Ribs, chicken wings, stephenson, sausage, bologna, salami, chitterlings, fatback, hot dogs, bratwurst, and packaged luncheon meats. Liver and organ meats.  Dairy  Whole or 2% milk, cream, half-and-half, and cream cheese. Whole milk cheeses. Whole-fat or sweetened yogurt. Full-fat cheeses. Nondairy creamers and whipped toppings. Processed cheese, cheese spreads, or cheese curds.  Sweets and Desserts  Corn syrup, sugars, honey, and molasses. Candy. Jam and jelly. Syrup. Sweetened cereals. Cookies, pies, cakes, donuts, muffins, and ice cream.  Fats and Oils  Butter, stick margarine, lard, shortening, ghee, or stephenson fat. Coconut, palm kernel, or palm  oils.  Beverages  Alcohol. Sweetened drinks (such as sodas, lemonade, and fruit drinks or punches).  The items listed above may not be a complete list of foods and beverages to avoid. Contact your dietitian for more information.  Document Released: 06/18/2013 Document Revised: 08/21/2015 Document Reviewed: 03/19/2015  [employer]® Patient Information ©2015 [employer], [facility]. This information is not intended to replace advice given to you by your health care provider. Make sure you discuss any questions you have with your health care provider. Cholesterol Content in Foods  Cholesterol is a waxy, fat-like substance that helps to carry fat in the blood. The body needs cholesterol in small amounts, but too much cholesterol can cause damage to the arteries and heart.  What foods have cholesterol?    Cholesterol is found in animal-based foods, such as meat, seafood, and dairy. Generally, low-fat dairy and lean meats have less cholesterol than full-fat dairy and fatty meats. The milligrams of cholesterol per serving (mg per serving) of common cholesterol-containing foods are listed below.  Meats and other proteins  Egg -- one large whole egg has 186 mg.  Veal shank -- 4 oz (113 g) has 141 mg.  Lean ground turkey (93% lean) -- 4 oz (113 g) has 118 mg.  Fat-trimmed lamb loin -- 4 oz (113 g) has 106 mg.  Lean ground beef (90% lean) -- 4 oz (113 g) has 100 mg.  Lobster -- 3.5 oz (99 g) has 90 mg.  Pork loin chops -- 4 oz (113 g) has 86 mg.  Canned salmon -- 3.5 oz (99 g) has 83 mg.  Fat-trimmed beef top loin -- 4 oz (113 g) has 78 mg.  Frankfurter -- 1 fina (3.5 oz or 99 g) has 77 mg.  Crab -- 3.5 oz (99 g) has 71 mg.  Roasted chicken without skin, white meat -- 4 oz (113 g) has 66 mg.  Light bologna -- 2 oz (57 g) has 45 mg.  Deli-cut turkey -- 2 oz (57 g) has 31 mg.  Canned tuna -- 3.5 oz (99 g) has 31 mg.  Cunningham -- 1 oz (28 g) has 29 mg.  Oysters and mussels (raw) -- 3.5 oz (99 g) has 25 mg.  Mackerel -- 1 oz (28 g) has 22  mg.  Trout -- 1 oz (28 g) has 20 mg.  Pork sausage -- 1 link (1 oz or 28 g) has 17 mg.  Vera -- 1 oz (28 g) has 16 mg.  Tilapia -- 1 oz (28 g) has 14 mg.  Dairy  Soft-serve ice cream -- ½ cup (4 oz or 86 g) has 103 mg.  Whole-milk yogurt -- 1 cup (8 oz or 245 g) has 29 mg.  Cheddar cheese -- 1 oz (28 g) has 28 mg.  American cheese -- 1 oz (28 g) has 28 mg.  Whole milk -- 1 cup (8 oz or 250 mL) has 23 mg.  2% milk -- 1 cup (8 oz or 250 mL) has 18 mg.  Cream cheese -- 1 tablespoon (Tbsp) (14.5 g) has 15 mg.  Cottage cheese -- ½ cup (4 oz or 113 g) has 14 mg.  Low-fat (1%) milk -- 1 cup (8 oz or 250 mL) has 10 mg.  Sour cream -- 1 Tbsp (12 g) has 8.5 mg.  Low-fat yogurt -- 1 cup (8 oz or 245 g) has 8 mg.  Nonfat Greek yogurt -- 1 cup (8 oz or 228 g) has 7 mg.  Half-and-half cream -- 1 Tbsp (15 mL) has 5 mg.  Fats and oils  Cod liver oil -- 1 tablespoon (Tbsp) (13.6 g) has 82 mg.  Butter -- 1 Tbsp (14 g) has 15 mg.  Lard -- 1 Tbsp (12.8 g) has 14 mg.  Cunningham grease -- 1 Tbsp (12.9 g) has 14 mg.  Mayonnaise -- 1 Tbsp (13.8 g) has 5-10 mg.  Margarine -- 1 Tbsp (14 g) has 3-10 mg.  The items listed above may not be a complete list of foods with cholesterol. Exact amounts of cholesterol in these foods may vary depending on specific ingredients and brands. Contact a dietitian for more information.  What foods do not have cholesterol?  Most plant-based foods do not have cholesterol unless you combine them with a food that has cholesterol. Foods without cholesterol include:  Grains and cereals.  Vegetables.  Fruits.  Vegetable oils, such as olive, canola, and sunflower oil.  Legumes, such as peas, beans, and lentils.  Nuts and seeds.  Egg whites.  The items listed above may not be a complete list of foods that do not have cholesterol. Contact a dietitian for more information.  Summary  The body needs cholesterol in small amounts, but too much cholesterol can cause damage to the arteries and heart.  Cholesterol is found in  animal-based foods, such as meat, seafood, and dairy. Generally, low-fat dairy and lean meats have less cholesterol than full-fat dairy and fatty meats.  This information is not intended to replace advice given to you by your health care provider. Make sure you discuss any questions you have with your health care provider.  Document Revised: 04/29/2022 Document Reviewed: 04/29/2022  [employer] Patient Education © 2023 [employer].

## 2024-03-18 NOTE — PROGRESS NOTES
"Chief Complaint  Annual Exam    Subjective        HPI   History of Present Illness  The patient is a 48-year-old male who presents for a wellness visit.    His blood pressure is normally a little less than that. He had a terrible morning at work this morning. He is an . He is on lisinopril 30 mg.    He stopped taking rosuvastatin 5 mg before [place] due to stomach issues. His stomach issues did get better after stopping the rosuvastatin. He is not sure if it was due to the rosuvastatin. He started taking rosuvastatin in 06/2023 or 07/2023. He has made some changes in his diet. He usually drinks a lot of coffee in the morning with half and half, so he cut out the half and half and started switching to oats for breakfast.    His CK was high on his last labs. He denies any cramps. He is running a fair amount and has been ramping up his mileage.    Supplemental Information  He is still taking omeprazole for heartburn and reflux. He is still taking fish oil and a multivitamin.   He has not received the latest COVID-19 vaccine.    Review of Systems   Constitutional:  Negative for fatigue.   HENT: Negative.     Eyes: Negative.    Respiratory: Negative.     Cardiovascular: Negative.    Gastrointestinal: Negative.    Genitourinary: Negative.    Musculoskeletal: Negative.    Skin:  Negative for rash.   Neurological:  Negative for dizziness.   Hematological:  Does not bruise/bleed easily.   Psychiatric/Behavioral:  Negative for dysphoric mood. The patient is not nervous/anxious.    All other systems reviewed and are negative.       Vital Signs:   Vitals:    03/18/24 1255 03/18/24 1326   BP: 144/72 130/78   BP Location: Right arm Right arm   Patient Position: Sitting Sitting   Cuff Size: Adult Adult   Pulse: 63    SpO2: 98%    Weight: 86.6 kg (191 lb)    Height: 182.9 cm (72.01\")             6/26/2023     8:06 AM   PHQ-2/PHQ-9 Depression Screening   Little Interest or Pleasure in Doing Things 0-->not at all   Feeling " Down, Depressed or Hopeless 0-->not at all   PHQ-9: Brief Depression Severity Measure Score 0       BMI is >= 25 and <30. (Overweight) The following options were offered after discussion;: exercise counseling/recommendations and nutrition counseling/recommendations        Physical Exam  Constitutional:       Appearance: Normal appearance. He is well-developed.   HENT:      Head: Normocephalic.      Right Ear: Hearing, tympanic membrane and external ear normal.      Left Ear: Hearing, tympanic membrane and external ear normal.   Eyes:      General: Lids are normal.      Conjunctiva/sclera: Conjunctivae normal.      Pupils: Pupils are equal, round, and reactive to light.      Funduscopic exam:     Right eye: No AV nicking or papilledema.         Left eye: No AV nicking or papilledema.   Neck:      Thyroid: No thyroid mass or thyromegaly.      Vascular: No carotid bruit or JVD.      Trachea: Trachea normal.   Cardiovascular:      Rate and Rhythm: Normal rate and regular rhythm.      Pulses: Normal pulses.      Heart sounds: Normal heart sounds. No murmur heard.  Pulmonary:      Effort: Pulmonary effort is normal.      Breath sounds: Normal breath sounds.   Abdominal:      General: Bowel sounds are normal.      Palpations: Abdomen is soft.      Tenderness: There is no abdominal tenderness.   Musculoskeletal:         General: Normal range of motion.      Cervical back: Normal range of motion and neck supple.      Lumbar back: No bony tenderness.   Lymphadenopathy:      Cervical: No cervical adenopathy.      Upper Body:      Right upper body: No supraclavicular adenopathy.      Left upper body: No supraclavicular adenopathy.   Skin:     General: Skin is warm and dry.      Findings: No rash.      Nails: There is no clubbing.   Neurological:      Mental Status: He is alert and oriented to person, place, and time.      Cranial Nerves: No cranial nerve deficit.      Deep Tendon Reflexes:      Reflex Scores:       Patellar  reflexes are 2+ on the right side and 2+ on the left side.  Psychiatric:         Speech: Speech normal.         Behavior: Behavior normal.         Thought Content: Thought content normal.         Judgment: Judgment normal.         The following data was reviewed by: Khoi Man MD on 03/18/2024:  Hepatitis C Antibody (03/07/2024 09:10)  PSA DIAGNOSTIC (03/07/2024 09:10)  TSH (03/07/2024 09:10)  CK (03/07/2024 09:10)  Lipid Panel With / Chol / HDL Ratio (03/07/2024 09:10)  CBC & Differential (03/07/2024 09:10)  Comprehensive Metabolic Panel (03/07/2024 09:10)    Results  Laboratory Studies  Labs were reviewed with the patient.   Hepatitis C Antibody (03/07/2024 09:10)  PSA DIAGNOSTIC (03/07/2024 09:10)  TSH (03/07/2024 09:10)  CK (03/07/2024 09:10)  Lipid Panel With / Chol / HDL Ratio (03/07/2024 09:10)  CBC & Differential (03/07/2024 09:10)  Comprehensive Metabolic Panel (03/07/2024 09:10)             Assessment and Plan     Orders Placed This Encounter   Procedures    CT Cardiac Calcium Score Without Dye    Tdap Vaccine Greater Than or Equal To 6yo IM    Comprehensive Metabolic Panel    CK    Lipid Panel With / Chol / HDL Ratio    CBC & Differential     New Medications Ordered This Visit   Medications    lisinopril (PRINIVIL,ZESTRIL) 30 MG tablet     Sig: Take 1 tablet by mouth Every Night.     Dispense:  90 tablet     Refill:  4    omeprazole (priLOSEC) 40 MG capsule     Sig: Take 1 capsule by mouth Every Night.     Dispense:  90 capsule     Refill:  4    rosuvastatin (CRESTOR) 5 MG tablet     Sig: Take 1 tablet by mouth Every Night.     Dispense:  90 tablet     Refill:  4     Assessment & Plan  1. Adult wellness visit.  He will receive the Adacel and Tdap. He will continue with heart healthy diet.  Continue healthy exercise  2. Hyperlipidemia.  His cholesterol went higher. I will restart the rosuvastatin 5 mg. He will report back to me with a MyChart message if he gets more stomach upset.    3. GERD  symptoms.  He will continue with the omeprazole.    4. Hypertension.  It seems like it is well controlled. We will continue with lisinopril 30 mg.    Follow-up  I will recheck labs in 3 months. Next wellness visit in 1 year         Patient was given instructions and counseling regarding his condition or for health maintenance advice. Please see specific information pulled into the AVS if appropriate.     Patient or patient representative verbalized consent for the use of Ambient Listening during the visit with  Khoi Man MD for chart documentation. 3/18/2024  13:36 EDT

## 2024-05-30 ENCOUNTER — HOSPITAL ENCOUNTER (OUTPATIENT)
Dept: CT IMAGING | Facility: HOSPITAL | Age: 49
Discharge: HOME | End: 2024-05-30
Admitting: FAMILY MEDICINE

## 2024-05-30 DIAGNOSIS — Z13.6 SCREENING FOR ISCHEMIC HEART DISEASE: ICD-10-CM

## 2024-05-30 DIAGNOSIS — E78.49 OTHER HYPERLIPIDEMIA: ICD-10-CM

## 2024-05-30 PROCEDURE — 75571 CT HRT W/O DYE W/CA TEST: CPT

## 2024-06-17 ENCOUNTER — OFFICE VISIT (OUTPATIENT)
Dept: FAMILY MEDICINE CLINIC | Facility: CLINIC | Age: 49
End: 2024-06-17
Payer: COMMERCIAL

## 2024-06-17 VITALS
HEIGHT: 72 IN | BODY MASS INDEX: 26.68 KG/M2 | SYSTOLIC BLOOD PRESSURE: 128 MMHG | OXYGEN SATURATION: 99 % | WEIGHT: 197 LBS | HEART RATE: 74 BPM | DIASTOLIC BLOOD PRESSURE: 82 MMHG

## 2024-06-17 DIAGNOSIS — E78.49 OTHER HYPERLIPIDEMIA: ICD-10-CM

## 2024-06-17 DIAGNOSIS — I25.10 ATHEROSCLEROSIS OF NATIVE CORONARY ARTERY OF NATIVE HEART WITHOUT ANGINA PECTORIS: Primary | ICD-10-CM

## 2024-06-17 DIAGNOSIS — M54.50 ACUTE MIDLINE LOW BACK PAIN WITHOUT SCIATICA: ICD-10-CM

## 2024-06-17 PROCEDURE — 99214 OFFICE O/P EST MOD 30 MIN: CPT | Performed by: FAMILY MEDICINE

## 2024-06-17 RX ORDER — LISINOPRIL 30 MG/1
30 TABLET ORAL NIGHTLY
Qty: 90 TABLET | Refills: 4 | Status: CANCELLED | OUTPATIENT
Start: 2024-06-17 | End: 2025-09-10

## 2024-06-17 RX ORDER — OMEPRAZOLE 40 MG/1
40 CAPSULE, DELAYED RELEASE ORAL NIGHTLY
Qty: 90 CAPSULE | Refills: 4 | Status: CANCELLED | OUTPATIENT
Start: 2024-06-17 | End: 2025-09-10

## 2024-06-17 RX ORDER — ASPIRIN 81 MG/1
81 TABLET ORAL NIGHTLY
Start: 2024-06-17 | End: 2024-07-17

## 2024-06-17 RX ORDER — ROSUVASTATIN CALCIUM 20 MG/1
20 TABLET, COATED ORAL NIGHTLY
Qty: 90 TABLET | Refills: 1 | Status: SHIPPED | OUTPATIENT
Start: 2024-06-17 | End: 2024-12-14

## 2024-06-17 NOTE — PROGRESS NOTES
"Chief Complaint  Follow-up (Labs/scan)    Subjective        HPI   History of Present Illness  The patient is a 48-year-old male who presents for evaluation of multiple medical concerns.    The patient was last evaluated in 03/2024, during which a CT cardiac calcium score was conducted. He has been consistently taking rosuvastatin 5 mg for approximately 2.5 months, which he tolerates well. He denies experiencing any muscle weakness, persistent muscle pains, or cramping. However, he has experienced back pain for the past 2 weeks. Approximately 15 years ago, he experienced shortness of breath and chest discomfort, which was subsequently attributed to reflux. These symptoms do not occur during physical activity, and he is capable of running 10 miles without difficulty.    The patient has been experiencing lower back pain, reminiscent of sciatic pain, for the past 2 weeks. The pain does not radiate to his legs. The pain typically resolves within a day or two. The pain is low-level for the majority of the day. If he does not engage in activities for 2 to 3 days, the pain improves. However, strenuous activities such as mowing the grass exacerbate the pain for a day. He is considering physical therapy. He experienced a trip one evening and experienced pain upon getting out of bed this morning.          Vital Signs:   Vitals:    06/17/24 1638   BP: 128/82   Pulse: 74   SpO2: 99%   Weight: 89.4 kg (197 lb)   Height: 182.9 cm (72\")            6/17/2024     4:41 PM   PHQ-2/PHQ-9 Depression Screening   Little Interest or Pleasure in Doing Things 0-->not at all   Feeling Down, Depressed or Hopeless 0-->not at all   PHQ-9: Brief Depression Severity Measure Score 0                 Physical Exam  Vitals reviewed.   Constitutional:       General: He is not in acute distress.  Eyes:      General: Lids are normal.      Conjunctiva/sclera: Conjunctivae normal.   Neck:      Vascular: No carotid bruit.      Trachea: No tracheal deviation. "   Cardiovascular:      Rate and Rhythm: Normal rate and regular rhythm.      Heart sounds: Normal heart sounds. No murmur heard.  Pulmonary:      Effort: Pulmonary effort is normal.      Breath sounds: Normal breath sounds.   Skin:     General: Skin is warm and dry.   Neurological:      Mental Status: He is alert. He is not disoriented.   Psychiatric:         Speech: Speech normal.         Behavior: Behavior normal. Behavior is cooperative.       Physical Exam           The following data was reviewed by: Khoi Man MD on 06/17/2024:  Lipid Panel With / Chol / HDL Ratio (06/07/2024 10:38)  CK (06/07/2024 10:38)  CBC & Differential (06/07/2024 10:38)  Comprehensive Metabolic Panel (06/07/2024 10:38)  CT Cardiac Calcium Score Without Dye (05/30/2024 08:30)   Results  Laboratory Studies  White blood cell count 5.5. Hemoglobin 14.6. Glucose 90. BUN 10. Creatinine 0.97. Total cholesterol 182, previously 212. Triglycerides 69, largely unchanged. . LDL 68, previously 105. Creatinine kinase 245, previously 244 and 233.    Imaging  CT cardiac calcium score shows some positivity in the left anterior descending.                Assessment and Plan     Orders Placed This Encounter   Procedures    Comprehensive Metabolic Panel    CK    Lipid Panel With / Chol / HDL Ratio    Ambulatory Referral to Cardiology    CBC & Differential     New Medications Ordered This Visit   Medications    rosuvastatin (CRESTOR) 20 MG tablet     Sig: Take 1 tablet by mouth Every Night for 180 days.     Dispense:  90 tablet     Refill:  1    aspirin 81 MG EC tablet     Sig: Take 1 tablet by mouth Every Night for 30 days.     Assessment & Plan  1. Atherosclerosis of native coronary artery (left anterior descending) without angina pectoris.  A referral to a cardiologist has been made. The patient has been advised to take aspirin 81 mg at bedtime and to transition to a high-intensity statin therapy at 20 mg at bedtime. A recheck of labs in 3  months, along with a follow-up appointment, has been recommended. The LDL goal should be less than 55. The patient has been advised to continue with a low-cholesterol, low-fat diet and regular exercise as tolerated.    2. Acute low back pain for a duration of 2 weeks.  A referral to physical therapy has been made.         Patient was given instructions and counseling regarding his condition or for health maintenance advice. Please see specific information pulled into the AVS if appropriate.     Patient or patient representative verbalized consent for the use of Ambient Listening during the visit with  Khoi Man MD for chart documentation. 6/17/2024  17:12 EDT

## 2024-06-17 NOTE — PATIENT INSTRUCTIONS
"Fat and Cholesterol Restricted Diet  Getting too much fat and cholesterol in your diet may cause health problems. Following this diet helps keep your fat and cholesterol at normal levels. This can keep you from getting sick.  WHAT TYPES OF FAT SHOULD I CHOOSE?  Choose monosaturated and polyunsaturated fats. These are found in foods such as olive oil, canola oil, flaxseeds, walnuts, almonds, and seeds.  Eat more omega-3 fats. Good choices include salmon, mackerel, sardines, tuna, flaxseed oil, and ground flaxseeds.  Limit saturated fats. These are in animal products such as meats, butter, and cream. They can also be in plant products such as palm oil, palm kernel oil, and coconut oil.      Avoid foods with partially hydrogenated oils in them. These contain trans fats. Examples of foods that have trans fats are stick margarine, some tub margarines, cookies, crackers, and other baked goods.  WHAT GENERAL GUIDELINES DO I NEED TO FOLLOW?    Check food labels. Look for the words \"trans fat\" and \"saturated fat.\"  When preparing a meal:  Fill half of your plate with vegetables and green salads.  Fill one fourth of your plate with whole grains. Look for the word \"whole\" as the first word in the ingredient list.  Fill one fourth of your plate with lean protein foods.  Limit fruit to two servings a day. Choose fruit instead of juice.  Eat more foods with soluble fiber. Examples of foods with this type of fiber are apples, broccoli, carrots, beans, peas, and barley. Try to get 20-30 g (grams) of fiber per day.  Eat more home-cooked foods. Eat less at restaurants and buffets.  Limit or avoid alcohol.  Limit foods high in starch and sugar.Fat and Cholesterol Restricted Eating Plan  Getting too much fat and cholesterol in your diet may cause health problems. Choosing the right foods helps keep your fat and cholesterol at normal levels. This can keep you from getting certain diseases.  Your doctor may recommend an eating plan that " "includes:  Total fat: ______% or less of total calories a day.  Saturated fat: ______% or less of total calories a day.  Cholesterol: less than _________mg a day.  Fiber: ______g a day.  What are tips for following this plan?  Meal planning  At meals, divide your plate into four equal parts:  Fill one-half of your plate with vegetables and green salads.  Fill one-fourth of your plate with whole grains.  Fill one-fourth of your plate with low-fat (lean) protein foods.  Eat fish that is high in omega-3 fats at least two times a week. This includes mackerel, tuna, sardines, and salmon.  Eat foods that are high in fiber, such as whole grains, beans, apples, broccoli, carrots, peas, and barley.  General tips    Work with your doctor to lose weight if you need to.  Avoid:  Foods with added sugar.  Fried foods.  Foods with partially hydrogenated oils.  Limit alcohol intake to no more than 1 drink a day for nonpregnant women and 2 drinks a day for men. One drink equals 12 oz of beer, 5 oz of wine, or 1½ oz of hard liquor.    Reading food labels  Check food labels for:  Trans fats.  Partially hydrogenated oils.  Saturated fat (g) in each serving.  Cholesterol (mg) in each serving.  Fiber (g) in each serving.  Choose foods with healthy fats, such as:  Monounsaturated fats.  Polyunsaturated fats.  Omega-3 fats.  Choose grain products that have whole grains. Look for the word \"whole\" as the first word in the ingredient list.  Cooking  Cook foods using low-fat methods. These include baking, boiling, grilling, and broiling.  Eat more home-cooked foods. Eat at restaurants and buffets less often.  Avoid cooking using saturated fats, such as butter, cream, palm oil, palm kernel oil, and coconut oil.  Recommended foods    Fruits  All fresh, canned (in natural juice), or frozen fruits.  Vegetables  Fresh or frozen vegetables (raw, steamed, roasted, or grilled). Green salads.  Grains  Whole grains, such as whole wheat or whole grain " breads, crackers, cereals, and pasta. Unsweetened oatmeal, bulgur, barley, quinoa, or brown rice. Corn or whole wheat flour tortillas.  Meats and other protein foods  Ground beef (85% or leaner), grass-fed beef, or beef trimmed of fat. Skinless chicken or turkey. Ground chicken or turkey. Pork trimmed of fat. All fish and seafood. Egg whites. Dried beans, peas, or lentils. Unsalted nuts or seeds. Unsalted canned beans. Nut butters without added sugar or oil.  Dairy  Low-fat or nonfat dairy products, such as skim or 1% milk, 2% or reduced-fat cheeses, low-fat and fat-free ricotta or cottage cheese, or plain low-fat and nonfat yogurt.  Fats and oils  Tub margarine without trans fats. Light or reduced-fat mayonnaise and salad dressings. Avocado. Olive, canola, sesame, or safflower oils.  The items listed above may not be a complete list of foods and beverages you can eat. Contact a dietitian for more information.  Foods to avoid  Fruits  Canned fruit in heavy syrup. Fruit in cream or butter sauce. Fried fruit.  Vegetables  Vegetables cooked in cheese, cream, or butter sauce. Fried vegetables.  Grains  White bread. White pasta. White rice. Cornbread. Bagels, pastries, and croissants. Crackers and snack foods that contain trans fat and hydrogenated oils.  Meats and other protein foods  Fatty cuts of meat. Ribs, chicken wings, stephenson, sausage, bologna, salami, chitterlings, fatback, hot dogs, bratwurst, and packaged lunch meats. Liver and organ meats. Whole eggs and egg yolks. Chicken and turkey with skin. Fried meat.  Dairy  Whole or 2% milk, cream, half-and-half, and cream cheese. Whole milk cheeses. Whole-fat or sweetened yogurt. Full-fat cheeses. Nondairy creamers and whipped toppings. Processed cheese, cheese spreads, and cheese curds.  Beverages  Alcohol. Sugar-sweetened drinks such as sodas, lemonade, and fruit drinks.  Fats and oils  Butter, stick margarine, lard, shortening, ghee, or stephenson fat. Coconut, palm  kernel, and palm oils.  Sweets and desserts  Corn syrup, sugars, honey, and molasses. Candy. Jam and jelly. Syrup. Sweetened cereals. Cookies, pies, cakes, donuts, muffins, and ice cream.  The items listed above may not be a complete list of foods and beverages you should avoid. Contact a dietitian for more information.  Summary  Choosing the right foods helps keep your fat and cholesterol at normal levels. This can keep you from getting certain diseases.  At meals, fill one-half of your plate with vegetables and green salads.  Eat high-fiber foods, like whole grains, beans, apples, carrots, peas, and barley.  Limit added sugar, saturated fats, alcohol, and fried foods.  This information is not intended to replace advice given to you by your health care provider. Make sure you discuss any questions you have with your health care provider.  Document Revised: 04/21/2021 Document Reviewed: 04/21/2021  [employer] Patient Education © 2021 [employer]    Limit fried foods.  Cook foods without frying them. Baking, boiling, grilling, and broiling are all great options.  Lose weight if you are overweight. Losing even a small amount of weight can help your overall health. It can also help prevent diseases such as diabetes and heart disease.  WHAT FOODS CAN I EAT?  Grains  Whole grains, such as whole wheat or whole grain breads, crackers, cereals, and pasta. Unsweetened oatmeal, bulgur, barley, quinoa, or brown rice. Corn or whole wheat flour tortillas.  Vegetables  Fresh or frozen vegetables (raw, steamed, roasted, or grilled). Green salads.  Fruits  All fresh, canned (in natural juice), or frozen fruits.  Meat and Other Protein Products  Ground beef (85% or leaner), grass-fed beef, or beef trimmed of fat. Skinless chicken or turkey. Ground chicken or turkey. Pork trimmed of fat. All fish and seafood. Eggs. Dried beans, peas, or lentils. Unsalted nuts or seeds. Unsalted canned or dry beans.  Dairy  Low-fat dairy products,  such as skim or 1% milk, 2% or reduced-fat cheeses, low-fat ricotta or cottage cheese, or plain low-fat yogurt.  Fats and Oils  Tub margarines without trans fats. Light or reduced-fat mayonnaise and salad dressings. Avocado. Olive, canola, sesame, or safflower oils. Natural peanut or almond butter (choose ones without added sugar and oil).  The items listed above may not be a complete list of recommended foods or beverages. Contact your dietitian for more options.  WHAT FOODS ARE NOT RECOMMENDED?  Grains  White bread. White pasta. White rice. Cornbread. Bagels, pastries, and croissants. Crackers that contain trans fat.  Vegetables  White potatoes. Corn. Creamed or fried vegetables. Vegetables in a cheese sauce.  Fruits  Dried fruits. Canned fruit in light or heavy syrup. Fruit juice.  Meat and Other Protein Products  Fatty cuts of meat. Ribs, chicken wings, stephenson, sausage, bologna, salami, chitterlings, fatback, hot dogs, bratwurst, and packaged luncheon meats. Liver and organ meats.  Dairy  Whole or 2% milk, cream, half-and-half, and cream cheese. Whole milk cheeses. Whole-fat or sweetened yogurt. Full-fat cheeses. Nondairy creamers and whipped toppings. Processed cheese, cheese spreads, or cheese curds.  Sweets and Desserts  Corn syrup, sugars, honey, and molasses. Candy. Jam and jelly. Syrup. Sweetened cereals. Cookies, pies, cakes, donuts, muffins, and ice cream.  Fats and Oils  Butter, stick margarine, lard, shortening, ghee, or stephenson fat. Coconut, palm kernel, or palm oils.  Beverages  Alcohol. Sweetened drinks (such as sodas, lemonade, and fruit drinks or punches).  The items listed above may not be a complete list of foods and beverages to avoid. Contact your dietitian for more information.  Document Released: 06/18/2013 Document Revised: 08/21/2015 Document Reviewed: 03/19/2015  [employer]® Patient Information ©2015 [employer], [facility]. This information is not intended to replace advice given to you by your health  care provider. Make sure you discuss any questions you have with your health care provider. Cholesterol Content in Foods  Cholesterol is a waxy, fat-like substance that helps to carry fat in the blood. The body needs cholesterol in small amounts, but too much cholesterol can cause damage to the arteries and heart.  What foods have cholesterol?    Cholesterol is found in animal-based foods, such as meat, seafood, and dairy. Generally, low-fat dairy and lean meats have less cholesterol than full-fat dairy and fatty meats. The milligrams of cholesterol per serving (mg per serving) of common cholesterol-containing foods are listed below.  Meats and other proteins  Egg -- one large whole egg has 186 mg.  Veal shank -- 4 oz (113 g) has 141 mg.  Lean ground turkey (93% lean) -- 4 oz (113 g) has 118 mg.  Fat-trimmed lamb loin -- 4 oz (113 g) has 106 mg.  Lean ground beef (90% lean) -- 4 oz (113 g) has 100 mg.  Lobster -- 3.5 oz (99 g) has 90 mg.  Pork loin chops -- 4 oz (113 g) has 86 mg.  Canned salmon -- 3.5 oz (99 g) has 83 mg.  Fat-trimmed beef top loin -- 4 oz (113 g) has 78 mg.  Frankfurter -- 1 fina (3.5 oz or 99 g) has 77 mg.  Crab -- 3.5 oz (99 g) has 71 mg.  Roasted chicken without skin, white meat -- 4 oz (113 g) has 66 mg.  Light bologna -- 2 oz (57 g) has 45 mg.  Deli-cut turkey -- 2 oz (57 g) has 31 mg.  Canned tuna -- 3.5 oz (99 g) has 31 mg.  Cunningham -- 1 oz (28 g) has 29 mg.  Oysters and mussels (raw) -- 3.5 oz (99 g) has 25 mg.  Mackerel -- 1 oz (28 g) has 22 mg.  Trout -- 1 oz (28 g) has 20 mg.  Pork sausage -- 1 link (1 oz or 28 g) has 17 mg.  Whitman -- 1 oz (28 g) has 16 mg.  Tilapia -- 1 oz (28 g) has 14 mg.  Dairy  Soft-serve ice cream -- ½ cup (4 oz or 86 g) has 103 mg.  Whole-milk yogurt -- 1 cup (8 oz or 245 g) has 29 mg.  Cheddar cheese -- 1 oz (28 g) has 28 mg.  American cheese -- 1 oz (28 g) has 28 mg.  Whole milk -- 1 cup (8 oz or 250 mL) has 23 mg.  2% milk -- 1 cup (8 oz or 250 mL) has 18  mg.  Cream cheese -- 1 tablespoon (Tbsp) (14.5 g) has 15 mg.  Cottage cheese -- ½ cup (4 oz or 113 g) has 14 mg.  Low-fat (1%) milk -- 1 cup (8 oz or 250 mL) has 10 mg.  Sour cream -- 1 Tbsp (12 g) has 8.5 mg.  Low-fat yogurt -- 1 cup (8 oz or 245 g) has 8 mg.  Nonfat Greek yogurt -- 1 cup (8 oz or 228 g) has 7 mg.  Half-and-half cream -- 1 Tbsp (15 mL) has 5 mg.  Fats and oils  Cod liver oil -- 1 tablespoon (Tbsp) (13.6 g) has 82 mg.  Butter -- 1 Tbsp (14 g) has 15 mg.  Lard -- 1 Tbsp (12.8 g) has 14 mg.  Cunningham grease -- 1 Tbsp (12.9 g) has 14 mg.  Mayonnaise -- 1 Tbsp (13.8 g) has 5-10 mg.  Margarine -- 1 Tbsp (14 g) has 3-10 mg.  The items listed above may not be a complete list of foods with cholesterol. Exact amounts of cholesterol in these foods may vary depending on specific ingredients and brands. Contact a dietitian for more information.  What foods do not have cholesterol?  Most plant-based foods do not have cholesterol unless you combine them with a food that has cholesterol. Foods without cholesterol include:  Grains and cereals.  Vegetables.  Fruits.  Vegetable oils, such as olive, canola, and sunflower oil.  Legumes, such as peas, beans, and lentils.  Nuts and seeds.  Egg whites.  The items listed above may not be a complete list of foods that do not have cholesterol. Contact a dietitian for more information.  Summary  The body needs cholesterol in small amounts, but too much cholesterol can cause damage to the arteries and heart.  Cholesterol is found in animal-based foods, such as meat, seafood, and dairy. Generally, low-fat dairy and lean meats have less cholesterol than full-fat dairy and fatty meats.  This information is not intended to replace advice given to you by your health care provider. Make sure you discuss any questions you have with your health care provider.  Document Revised: 04/29/2022 Document Reviewed: 04/29/2022  Elsevier Patient Education © 2024 [employer]vier Inc.

## 2024-07-11 ENCOUNTER — TREATMENT (OUTPATIENT)
Dept: PHYSICAL THERAPY | Facility: CLINIC | Age: 49
End: 2024-07-11
Payer: COMMERCIAL

## 2024-07-11 DIAGNOSIS — M53.3 SI (SACROILIAC) JOINT DYSFUNCTION: ICD-10-CM

## 2024-07-11 DIAGNOSIS — M54.50 ACUTE MIDLINE LOW BACK PAIN WITHOUT SCIATICA: Primary | ICD-10-CM

## 2024-07-11 NOTE — PROGRESS NOTES
Physical Therapy Initial Evaluation and Plan of Care  [address] [place] [address]    Patient: Dg Harden   : 1975  Diagnosis/ICD-10 Code:  Acute midline low back pain without sciatica [M54.50]  Referring practitioner: Khoi Man MD  Date of Initial Visit: 2024  Today's Date: 2024  Patient seen for 1 sessions           Visit Diagnoses:     ICD-10-CM ICD-9-CM   1. Acute midline low back pain without sciatica  M54.50 724.2   2. SI (sacroiliac) joint dysfunction  M53.3 724.6        Subjective Questionnaire: Modified Oswestry: 2 = 4% limited    Subjective Evaluation    History of Present Illness  Mechanism of injury: Pt reports Acute midline low back pain without sciatica which began 24. Pt states sometimes the R side is a little worse than the L. Pt thinks it started when he tripped over a carpet without falling, then stood on cement for 2 hours after. Pt states he could barely move the next morning. Pt states it's gotten better, but mornings tend to be the worst. Pt states there are days he doesn't notice it as much. Pt states he's had similar episodes in the past that were less severe and less prolonged.     Pt states no imaging was done and no meds were given. OPPT was ordered.    PMH: seasonal allergies, HTN controlled with meds    Denies hx: pacemaker, metal implants, CA, CVA, seizures, MI, DM, latex allergies    Pain: 0/10 current, 0/10 at best, 2/10 at worst    Aggravating/functional factors: heavy lifting, painting (rolling paint on a ceiling), prolonged standing, bending over, squatting    PLOF: some prior issues with the above functional activities with prior LB issues 2-3x/yr but not as intense and not as long lasting as this time was    Relieving factors: ice    Social Hx: lives with spouse & kids;  computer; runs for [email or web address]      Quality of life: excellent    Pain  Quality: dull ache  Progression: improved    Hand dominance: right    Patient  Goals  Patient goals for therapy: decreased pain  Patient goal: Return to normal function; know how to manage it for the future         Objective          Active Range of Motion     Additional Active Range of Motion Details  No pain to start with  Lumb:   Flex WNL; reps no affect  Ext WFL: reps no affect     Trunk SB: WNL B  Trunk Rot: WNL B    Hip   Flex: WFL B  Rot: WFL B     Strength/Myotome Testing     Left Hip   Planes of Motion   Flexion: 5  Extension: 4+  External rotation: 4+  Internal rotation: 4-    Right Hip   Planes of Motion   Flexion: 5  Extension: 4+  External rotation: 4+  Internal rotation: 4+    Left Knee   Flexion: 5  Extension: 5    Right Knee   Flexion: 5  Extension: 5    Left Ankle/Foot   Dorsiflexion: 5  Great toe extension: 5    Right Ankle/Foot   Dorsiflexion: 5  Great toe extension: 5    Additional Strength Details  Guido max resistance hip abd/ER & add/IR         Observation: convexity ~T6-9    Palpation: no TTP throughout L/S     Sensation: intact/equal to LT B LEs    Posture: head fwd/rounded shoulders; R shld lower in standing    Gait: I without AD, near normal gt speed, R outflare noted    Balance: appears good with walking in the clinic    Transfers: I supine to/from sit with fair/good body mechanics; I sit to/from stand without difficulty    Flexibility: tight hams, hip rotators, hip flexors, quads, QL, ITB      Assessment & Plan       Assessment  Impairments: abnormal coordination, abnormal gait, abnormal muscle firing, abnormal muscle tone, abnormal or restricted ROM, activity intolerance, impaired balance, impaired physical strength, lacks appropriate home exercise program, pain with function, safety issue and weight-bearing intolerance   Assessment details: The patient is a 48 y.o. male who presents to physical therapy today for Acute midline low back pain without sciatica. Upon initial evaluation, the patient demonstrates the above & following impairments: pain, reduced posture,  SI/IS dysfunction, decreased ROM/flexibility, strength, gait, balance and function. Due to these impairments, the patient is unable to/limited with: heavy lifting, painting (rolling paint on a ceiling), prolonged standing, bending over, and squatting. The patient would benefit from skilled PT services to address functional limitations and impairments and to improve patient quality of life.      Barriers to therapy: HTN could affect PT Rx/progress/outcomes if exacerbated/unregulated which could affect tolerance to PT/exs  Prognosis: good    Goals  Plan Goals: STGs in 4 weeks:  Decrease pain to 1/10 on average  Increase LE strength to 4/5    LTGs by discharge  Increase LE strength to 5/5   Pt will be able to ascend/descend stairs reciprocally with or without use of rail(s) and with minimal difficulty or pain  Pt will be able to paint or perform overhead activities without difficulty or pain  Pt will be able to stand  mins for ADLs or work without difficulty or pain  Pt will be able to squat and bend over for ADLs/work activities without difficulty, pain or LOB  Pt will be able to lift/carry heavy items without difficulty or increased pain      Plan  Therapy options: will be seen for skilled therapy services  Planned modality interventions: cryotherapy, thermotherapy (hydrocollator packs), electrical stimulation/Belarusian stimulation, ultrasound, traction and TENS  Planned therapy interventions: manual therapy, neuromuscular re-education, postural training, soft tissue mobilization, spinal/joint mobilization, strengthening, stretching, therapeutic activities, transfer training, abdominal trunk stabilization, ADL retraining, body mechanics training, home exercise program, gait training, functional ROM exercises, flexibility, motor coordination training, balance/weight-bearing training and joint mobilization  Other planned therapy interventions: self care  Frequency: 2x week  Duration in weeks: 13  Treatment plan  discussed with: patient        History # of Personal Factors and/or Comorbidities: MODERATE (1-2)  Examination of Body System(s): # of elements: HIGH (4+)  Clinical Presentation: STABLE   Clinical Decision Making: LOW       Timed:         Manual Therapy:    3     mins  71746;     Therapeutic Exercise:   10      mins  26429;     Neuromuscular Radha:        mins  49615;    Therapeutic Activity:          mins  99835;     Gait Training:           mins  02434;     Ultrasound:          mins  73052;    Ionto                                   mins   40316  Self Care                        8    mins   68543      Un-Timed:  Electrical Stimulation:         mins  81485 ( );  Canalith Repos                   mins  46191  Dry Needle 1-2 ms      ___  mins 61260  Dry Needle  3+ ms              mins 29882  Traction          mins 18910  Low Eval    23      Mins  20394  Mod Eval          Mins  59130  High Eval                            Mins  95949  Re-Eval                               mins  14870        Timed Treatment:   21   mins   Total Treatment:     44   mins            PT SIGNATURE: Shena Mcdonald, PT   [place] Lic# [id number]  DATE TREATMENT INITIATED: 7/11/2024    Initial Certification  Certification Period: 7/11/2024 through 10/8/2024  I certify that the therapy services are furnished while this patient is under my care.  The services outlined above are required by this patient, and will be reviewed every 90 days.         Physician Signature: _________________________  PHYSICIAN: Khoi Man MD   NPI: [id number]                                             DATE: _____________________________________    Please sign and return via fax to [phone]. Thank you, [facility] Physical Therapy.

## 2024-07-29 ENCOUNTER — TREATMENT (OUTPATIENT)
Dept: PHYSICAL THERAPY | Facility: CLINIC | Age: 49
End: 2024-07-29
Payer: COMMERCIAL

## 2024-07-29 DIAGNOSIS — M53.3 SI (SACROILIAC) JOINT DYSFUNCTION: ICD-10-CM

## 2024-07-29 DIAGNOSIS — M54.50 ACUTE MIDLINE LOW BACK PAIN WITHOUT SCIATICA: Primary | ICD-10-CM

## 2024-07-29 PROCEDURE — 97110 THERAPEUTIC EXERCISES: CPT | Performed by: PHYSICAL THERAPIST

## 2024-07-29 PROCEDURE — 97112 NEUROMUSCULAR REEDUCATION: CPT | Performed by: PHYSICAL THERAPIST

## 2024-07-29 NOTE — PROGRESS NOTES
Physical Therapy Treatment Note  [address] [address]      Patient: Dg Harden   : 1975  Diagnosis/ICD-10 Code:  Acute midline low back pain without sciatica [M54.50]  Referring practitioner: Khoi Man MD  Date of Initial Visit: Type: THERAPY  Noted: 2024  Today's Date: 2024  Patient seen for 2 sessions           Visit Diagnoses:     ICD-10-CM ICD-9-CM   1. Acute midline low back pain without sciatica  M54.50 724.2   2. SI (sacroiliac) joint dysfunction  M53.3 724.6       Subjective Dg Harden reports: he had a little pain over the weekend with traveling and sleeping on an air mattress. Pt states last night was the first night in his bed after traveling. Pt states he wasn't able to keep up with all the exs every day, but did what he could and got some exs almost every day. Pain is mostly with first getting out of bed and once he gets moving it's usually ok for the rest of the day. Pt states he can do the hip flexor stretches ok at home on his bed, but if a lower bed, it's ineffective.     Pain: 0    Objective     See Exercise, Manual, and Modality Logs for complete treatment.     Patient Education: cues for therex/theracts/NMR for form, reps, holds; discussed new tasks only 1x/day at home for strengthening; discussed     Assessment/Plan Pt tolerated progressions per flow sheet with lumb stabilization well without any c/o increased pain. Added standing activities as noted per flowsheet with cues for standing tall and avoiding compensation while maintaining abd hold.  Updated HEP. Pt declined modalities at end of session.       Progress per Plan of Care and Progress strengthening /stabilization /functional activity            Timed:         Manual Therapy:         mins  47530;     Therapeutic Exercise:   12      mins  19574;     Neuromuscular Radha:   23     mins  26454;    Therapeutic Activity:      2    mins  60205;     Gait Training:            mins  60084;     Ultrasound:          mins  22392;    Ionto                                   mins   06665  Self Care                            mins   74234    Un-Timed:  Electrical Stimulation:         mins  36676 ( );  Traction          mins 00486  Canalith Repos                   mins  45070  Dry Needle 1-2 ms      ___  mins 55464  Dry Needle  3+ ms              mins 69770  Low Eval          mins  50318  Mod Eval          Mins  77292  High Eval                            Mins  19770  Re-Eval                               mins  00256    Timed Treatment:   37    mins   Total Treatment:     37   mins          Shena Mcdonald, PT    Physical Therapist

## 2024-07-31 ENCOUNTER — TREATMENT (OUTPATIENT)
Dept: PHYSICAL THERAPY | Facility: CLINIC | Age: 49
End: 2024-07-31
Payer: COMMERCIAL

## 2024-07-31 DIAGNOSIS — M54.50 ACUTE MIDLINE LOW BACK PAIN WITHOUT SCIATICA: Primary | ICD-10-CM

## 2024-07-31 DIAGNOSIS — M53.3 SI (SACROILIAC) JOINT DYSFUNCTION: ICD-10-CM
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
## 2024-08-02 ENCOUNTER — OFFICE VISIT (OUTPATIENT)
Dept: CARDIOLOGY | Facility: CLINIC | Age: 49
End: 2024-08-02
Payer: COMMERCIAL

## 2024-08-02 VITALS
WEIGHT: 197 LBS | DIASTOLIC BLOOD PRESSURE: 84 MMHG | BODY MASS INDEX: 26.68 KG/M2 | SYSTOLIC BLOOD PRESSURE: 138 MMHG | HEIGHT: 72 IN | HEART RATE: 63 BPM

## 2024-08-02 DIAGNOSIS — I25.84 CORONARY ARTERY CALCIFICATION: ICD-10-CM

## 2024-08-02 DIAGNOSIS — I10 ESSENTIAL HYPERTENSION: Primary | ICD-10-CM

## 2024-08-02 DIAGNOSIS — I25.10 CORONARY ARTERY CALCIFICATION: ICD-10-CM

## 2024-08-02 NOTE — PROGRESS NOTES
"      CARDIOLOGY    Israel Almonte MD    ENCOUNTER DATE:  08/02/2024    Dg Harden / 48 y.o. / male        CHIEF COMPLAINT / REASON FOR OFFICE VISIT     Coronary Artery Disease and abnormal calcium score      HISTORY OF PRESENT ILLNESS       Coronary Artery Disease      Dg Harden is a 48 y.o. male who presents today for consultation.  Patient underwent a calcium scoring and he was found to have 101 total calcium score however it was all located in his LAD.  Patient does have a family history of coronary artery disease.  He currently is asymptomatic.  He says he runs a couple half marathons a year does not have any symptoms with any of this.  He has a history of high blood pressure he remains on medications and his blood pressures are well-controlled.  Patient was referred for further evaluation.      The following portions of the patient's history were reviewed and updated as appropriate: allergies, current medications, past family history, past medical history, past social history, past surgical history and problem list.      VITAL SIGNS     Visit Vitals  /84 (BP Location: Left arm)   Pulse 63   Ht 182.9 cm (72\")   Wt 89.4 kg (197 lb)   BMI 26.72 kg/m²         Wt Readings from Last 3 Encounters:   08/02/24 89.4 kg (197 lb)   06/17/24 89.4 kg (197 lb)   03/18/24 86.6 kg (191 lb)     Body mass index is 26.72 kg/m².      REVIEW OF SYSTEMS   ROS        PHYSICAL EXAMINATION     Vitals reviewed.   Constitutional:       Appearance: Healthy appearance.   Pulmonary:      Effort: Pulmonary effort is normal.   Cardiovascular:      Normal rate. Regular rhythm. Normal S1. Normal S2.       Murmurs: There is no murmur.      No gallop.  No click. No rub.   Pulses:     Intact distal pulses.   Edema:     Peripheral edema absent.   Neurological:      Mental Status: Alert.           REVIEWED DATA       ECG 12 Lead    Date/Time: 8/2/2024 4:07 PM  Performed by: Israel Almonte MD    Authorized by: Gage, " Israel PONCE MD  Previous ECG: no previous ECG available  Rhythm: sinus rhythm    Clinical impression: normal ECG          Cardiac Procedures:      Lipid Panel          3/7/2024    09:10 6/7/2024    10:38   Lipid Panel   Total Cholesterol 212  182    Triglycerides 73  69    HDL Cholesterol 94  101    VLDL Cholesterol 13  13    LDL Cholesterol  105  68          ASSESSMENT & PLAN      Diagnosis Plan   1. Essential hypertension        2. Coronary artery calcification              SUMMARY/DISCUSSION  Hypertension blood pressures good  Coronary artery calcification.  Patient's ECG was completely normal.  He is also very active he runs several half marathons a year and has no issues.  We did review signs and symptoms to look for.  I was looking at his cholesterol profile and I told the patient that I would keep his LDL somewhere between 50-70.  With the calcification I also told him take a baby aspirin a day.  I did see patient back on an as-needed basis I did review signs and symptoms to look for and when to contact me if he has any questions.        MEDICATIONS         Discharge Medications            Accurate as of August 2, 2024  4:06 PM. If you have any questions, ask your nurse or doctor.                Continue These Medications        Instructions Start Date   CALCIUM/C/D PO   Oral      fish oil 1000 MG capsule capsule   Oral, Daily With Breakfast      lisinopril 30 MG tablet  Commonly known as: PRINIVIL,ZESTRIL   30 mg, Oral, Nightly      multivitamin with minerals tablet tablet   1 tablet, Oral, Daily      omeprazole 40 MG capsule  Commonly known as: priLOSEC   40 mg, Oral, Nightly      rosuvastatin 20 MG tablet  Commonly known as: CRESTOR   20 mg, Oral, Nightly                   **Dragon Disclaimer:   Much of this encounter note is an electronic transcription/translation of spoken language to printed text. The electronic translation of spoken language may permit erroneous, or at times, nonsensical words or  phrases to be inadvertently transcribed. Although I have reviewed the note for such errors, some may still exist.

## 2024-08-05 ENCOUNTER — TREATMENT (OUTPATIENT)
Dept: PHYSICAL THERAPY | Facility: CLINIC | Age: 49
End: 2024-08-05
Payer: COMMERCIAL

## 2024-08-05 DIAGNOSIS — M53.3 SI (SACROILIAC) JOINT DYSFUNCTION: ICD-10-CM

## 2024-08-05 DIAGNOSIS — M54.50 ACUTE MIDLINE LOW BACK PAIN WITHOUT SCIATICA: Primary | ICD-10-CM

## 2024-08-05 PROCEDURE — 97112 NEUROMUSCULAR REEDUCATION: CPT | Performed by: PHYSICAL THERAPIST

## 2024-08-05 PROCEDURE — 97530 THERAPEUTIC ACTIVITIES: CPT | Performed by: PHYSICAL THERAPIST

## 2024-08-05 PROCEDURE — 97110 THERAPEUTIC EXERCISES: CPT | Performed by: PHYSICAL THERAPIST

## 2024-08-05 NOTE — PROGRESS NOTES
Physical Therapy Treatment Note  [id number] [place] [address]      Patient: Dg Harden   : 1975  Diagnosis/ICD-10 Code:  Acute midline low back pain without sciatica [M54.50]  Referring practitioner: Khoi Man MD  Date of Initial Visit: Type: THERAPY  Noted: 2024  Today's Date: 2024  Patient seen for 4 sessions           Visit Diagnoses:     ICD-10-CM ICD-9-CM   1. Acute midline low back pain without sciatica  M54.50 724.2   2. SI (sacroiliac) joint dysfunction  M53.3 724.6       Subjective Dg Harden reports: no pain at present.     Objective     See Exercise, Manual, and Modality Logs for complete treatment.     Patient Education: cues for therex form, reps, holds, abd hold, posture/alignment     Assessment/Plan Pt tolerated session well with progressions and did not report any increased pain. Trial of HEP after next session if still doing well.     Progress per Plan of Care and Progress strengthening /stabilization /functional activity            Timed:         Manual Therapy:         mins  72857;     Therapeutic Exercise:   15    mins  54365;     Neuromuscular Radha:  23      mins  07308;    Therapeutic Activity:    8      mins  11783;     Gait Training:           mins  90395;     Ultrasound:          mins  60567;    Ionto                                   mins   37436  Self Care                            mins   96485    Un-Timed:  Electrical Stimulation:         mins  01300 ( );  Traction          mins 83598  Canalith Repos                   mins  83046  Dry Needle 1-2 ms      ___  mins 77047  Dry Needle  3+ ms              mins 12415  Low Eval          mins  11704  Mod Eval          Mins  73487  High Eval                            Mins  55323  Re-Eval                               mins  82738    Timed Treatment:  46    mins   Total Treatment:    46    mins          Shena Mcdonald, PT    Physical Therapist

## 2024-08-23 ENCOUNTER — TELEPHONE (OUTPATIENT)
Dept: PHYSICAL THERAPY | Facility: CLINIC | Age: 49
End: 2024-08-23
Payer: COMMERCIAL

## 2024-08-23 NOTE — TELEPHONE ENCOUNTER
CALLED PATIENT TO CHECK IN ON THEM & REACHED THEIR VOICEMAIL.  LEFT VM TO CALL US BACK REGARDING SCHEDULING & PROVIDED OUR PH# [phone], OPT 2...

## 2024-09-11 ENCOUNTER — TELEPHONE (OUTPATIENT)
Dept: FAMILY MEDICINE CLINIC | Facility: CLINIC | Age: 49
End: 2024-09-11
Payer: COMMERCIAL

## 2024-09-11 NOTE — TELEPHONE ENCOUNTER
Spoke with [facility] to inform the correct dose of patient medication is 20 mg per dr. Man. Pharmacist voiced understanding and had no further questions for our office.

## 2024-09-11 NOTE — TELEPHONE ENCOUNTER
Pharmacy Name: [facility] - [facility] - [address] [facility] AT PORTAL TO REGISTERED [facility] - [phone]  - [phone]      Reference Number (if applicable): [id number]    Pharmacy representative name: TROY    Pharmacy representative phone number:[phone] OPTION  2    What medication are you calling in regards to: rosuvastatin (CRESTOR)     What question does the pharmacy have: PHARMACY IS WANTING TO KNOW IF PATIENT IS TAKING 5 MG OR 20 MG OF THE rosuvastatin (CRESTOR)  OR BOTH. PLEASE ADVISE.      Who is the provider that prescribed the medication: DR MCCAIN    Additional notes: N/A

## 2024-09-30 ENCOUNTER — OFFICE VISIT (OUTPATIENT)
Dept: FAMILY MEDICINE CLINIC | Facility: CLINIC | Age: 49
End: 2024-09-30
Payer: COMMERCIAL

## 2024-09-30 VITALS
DIASTOLIC BLOOD PRESSURE: 82 MMHG | HEIGHT: 72 IN | WEIGHT: 195 LBS | HEART RATE: 76 BPM | BODY MASS INDEX: 26.41 KG/M2 | SYSTOLIC BLOOD PRESSURE: 124 MMHG | OXYGEN SATURATION: 99 %

## 2024-09-30 DIAGNOSIS — E78.49 OTHER HYPERLIPIDEMIA: ICD-10-CM

## 2024-09-30 DIAGNOSIS — I25.10 ATHEROSCLEROSIS OF NATIVE CORONARY ARTERY OF NATIVE HEART WITHOUT ANGINA PECTORIS: ICD-10-CM

## 2024-09-30 PROCEDURE — 99213 OFFICE O/P EST LOW 20 MIN: CPT | Performed by: FAMILY MEDICINE

## 2024-09-30 RX ORDER — ASPIRIN 81 MG/1
81 TABLET ORAL DAILY
COMMUNITY

## 2024-09-30 RX ORDER — ROSUVASTATIN CALCIUM 20 MG/1
20 TABLET, COATED ORAL NIGHTLY
Qty: 90 TABLET | Refills: 1 | Status: SHIPPED | OUTPATIENT
Start: 2024-09-30 | End: 2025-03-29

## 2024-09-30 NOTE — PROGRESS NOTES
"Chief Complaint  Follow-up (3 month), Atherosclerosis of native coronary artery (left anterior de, and Acute low back pain for a duration of 2 weeks    Subjective        Follow-up        The patient is a 48-year-old male who presents for follow-up on some medicine changes.    He reports that his rosuvastatin dosage was increased from 5 mg to 20 mg, which he has been taking for several months without any noticeable side effects. He also mentions a recent consultation with Dr. Almonte regarding his cholesterol levels. His diet has been modified to include fewer eggs and red meat.           Vital Signs:   Vitals:    09/30/24 0935   BP: 124/82   Pulse: 76   SpO2: 99%   Weight: 88.5 kg (195 lb)   Height: 182.9 cm (72\")            6/17/2024     4:41 PM   PHQ-2/PHQ-9 Depression Screening   Little Interest or Pleasure in Doing Things 0-->not at all   Feeling Down, Depressed or Hopeless 0-->not at all   PHQ-9: Brief Depression Severity Measure Score 0                 Physical Exam  Vitals reviewed.   Constitutional:       General: He is not in acute distress.  Cardiovascular:      Rate and Rhythm: Normal rate and regular rhythm.      Heart sounds: Normal heart sounds.   Pulmonary:      Effort: Pulmonary effort is normal.      Breath sounds: Normal breath sounds.   Neurological:      General: No focal deficit present.      Mental Status: He is alert.   Psychiatric:         Attention and Perception: Attention normal.         Mood and Affect: Mood normal.         Behavior: Behavior normal.                 The following data was reviewed by: Khoi Man MD on 09/30/2024:  Lipid Panel With / Chol / HDL Ratio (09/19/2024 08:02)  CK (09/19/2024 08:02)  CBC & Differential (09/19/2024 08:02)  Comprehensive Metabolic Panel (09/19/2024 08:02)    Results  Laboratory Studies  White blood cell count 4.67. Hemoglobin 14.6. Hematocrit 44.3. Platelets are 203. Glucose 93. BUN 14, creatinine 1.04. Total cholesterol 172, LDL cholesterol 59, " HDL cholesterol 97. Creatinine kinase 190.                Assessment and Plan     Orders Placed This Encounter   Procedures    Comprehensive Metabolic Panel    CK    Lipid Panel With / Chol / HDL Ratio    CBC & Differential     New Medications Ordered This Visit   Medications    rosuvastatin (CRESTOR) 20 MG tablet     Sig: Take 1 tablet by mouth Every Night for 180 days.     Dispense:  90 tablet     Refill:  1        1. Hyperlipidemia.  His LDL cholesterol is nearly to goal at 59. He will continue the current medication regimen of rosuvastatin 20 mg. He is advised to maintain a cholesterol-restrictive diet and continue with aspirin therapy. Information on a low cholesterol, low-fat diet and cholesterol content in food is shared in the after-visit summary. Labs will be repeated in 6 months to monitor liver function and cholesterol levels.    2. Atherosclerosis of native coronary artery without angina.  He will continue with the current treatment plan, including rosuvastatin 20 mg and aspirin therapy. He is advised to monitor for any symptoms such as stomach pain or skin color changes, which could indicate worsening liver function. If any issues arise, he should notify the clinic immediately. Interim labs may be set up to ensure there is no progression of liver function abnormalities.    Follow-up  Return in 6 months for follow-up.     Return in about 6 months (around 3/26/2025) for Adult wellness & medication appt, schedule 30 min.     Patient was given instructions and counseling regarding his condition or for health maintenance advice. Please see specific information pulled into the AVS if appropriate.     Patient or patient representative verbalized consent for the use of Ambient Listening during the visit with  Khoi Man MD for chart documentation. 9/30/2024  10:01 EDT

## 2024-09-30 NOTE — PATIENT INSTRUCTIONS
"Fat and Cholesterol Restricted Diet  Getting too much fat and cholesterol in your diet may cause health problems. Following this diet helps keep your fat and cholesterol at normal levels. This can keep you from getting sick.  WHAT TYPES OF FAT SHOULD I CHOOSE?  Choose monosaturated and polyunsaturated fats. These are found in foods such as olive oil, canola oil, flaxseeds, walnuts, almonds, and seeds.  Eat more omega-3 fats. Good choices include salmon, mackerel, sardines, tuna, flaxseed oil, and ground flaxseeds.  Limit saturated fats. These are in animal products such as meats, butter, and cream. They can also be in plant products such as palm oil, palm kernel oil, and coconut oil.      Avoid foods with partially hydrogenated oils in them. These contain trans fats. Examples of foods that have trans fats are stick margarine, some tub margarines, cookies, crackers, and other baked goods.  WHAT GENERAL GUIDELINES DO I NEED TO FOLLOW?    Check food labels. Look for the words \"trans fat\" and \"saturated fat.\"  When preparing a meal:  Fill half of your plate with vegetables and green salads.  Fill one fourth of your plate with whole grains. Look for the word \"whole\" as the first word in the ingredient list.  Fill one fourth of your plate with lean protein foods.  Limit fruit to two servings a day. Choose fruit instead of juice.  Eat more foods with soluble fiber. Examples of foods with this type of fiber are apples, broccoli, carrots, beans, peas, and barley. Try to get 20-30 g (grams) of fiber per day.  Eat more home-cooked foods. Eat less at restaurants and buffets.  Limit or avoid alcohol.  Limit foods high in starch and sugar.Fat and Cholesterol Restricted Eating Plan  Getting too much fat and cholesterol in your diet may cause health problems. Choosing the right foods helps keep your fat and cholesterol at normal levels. This can keep you from getting certain diseases.  Your doctor may recommend an eating plan that " "includes:  Total fat: ______% or less of total calories a day.  Saturated fat: ______% or less of total calories a day.  Cholesterol: less than _________mg a day.  Fiber: ______g a day.  What are tips for following this plan?  Meal planning  At meals, divide your plate into four equal parts:  Fill one-half of your plate with vegetables and green salads.  Fill one-fourth of your plate with whole grains.  Fill one-fourth of your plate with low-fat (lean) protein foods.  Eat fish that is high in omega-3 fats at least two times a week. This includes mackerel, tuna, sardines, and salmon.  Eat foods that are high in fiber, such as whole grains, beans, apples, broccoli, carrots, peas, and barley.  General tips    Work with your doctor to lose weight if you need to.  Avoid:  Foods with added sugar.  Fried foods.  Foods with partially hydrogenated oils.  Limit alcohol intake to no more than 1 drink a day for nonpregnant women and 2 drinks a day for men. One drink equals 12 oz of beer, 5 oz of wine, or 1½ oz of hard liquor.    Reading food labels  Check food labels for:  Trans fats.  Partially hydrogenated oils.  Saturated fat (g) in each serving.  Cholesterol (mg) in each serving.  Fiber (g) in each serving.  Choose foods with healthy fats, such as:  Monounsaturated fats.  Polyunsaturated fats.  Omega-3 fats.  Choose grain products that have whole grains. Look for the word \"whole\" as the first word in the ingredient list.  Cooking  Cook foods using low-fat methods. These include baking, boiling, grilling, and broiling.  Eat more home-cooked foods. Eat at restaurants and buffets less often.  Avoid cooking using saturated fats, such as butter, cream, palm oil, palm kernel oil, and coconut oil.  Recommended foods    Fruits  All fresh, canned (in natural juice), or frozen fruits.  Vegetables  Fresh or frozen vegetables (raw, steamed, roasted, or grilled). Green salads.  Grains  Whole grains, such as whole wheat or whole grain " breads, crackers, cereals, and pasta. Unsweetened oatmeal, bulgur, barley, quinoa, or brown rice. Corn or whole wheat flour tortillas.  Meats and other protein foods  Ground beef (85% or leaner), grass-fed beef, or beef trimmed of fat. Skinless chicken or turkey. Ground chicken or turkey. Pork trimmed of fat. All fish and seafood. Egg whites. Dried beans, peas, or lentils. Unsalted nuts or seeds. Unsalted canned beans. Nut butters without added sugar or oil.  Dairy  Low-fat or nonfat dairy products, such as skim or 1% milk, 2% or reduced-fat cheeses, low-fat and fat-free ricotta or cottage cheese, or plain low-fat and nonfat yogurt.  Fats and oils  Tub margarine without trans fats. Light or reduced-fat mayonnaise and salad dressings. Avocado. Olive, canola, sesame, or safflower oils.  The items listed above may not be a complete list of foods and beverages you can eat. Contact a dietitian for more information.  Foods to avoid  Fruits  Canned fruit in heavy syrup. Fruit in cream or butter sauce. Fried fruit.  Vegetables  Vegetables cooked in cheese, cream, or butter sauce. Fried vegetables.  Grains  White bread. White pasta. White rice. Cornbread. Bagels, pastries, and croissants. Crackers and snack foods that contain trans fat and hydrogenated oils.  Meats and other protein foods  Fatty cuts of meat. Ribs, chicken wings, stephenson, sausage, bologna, salami, chitterlings, fatback, hot dogs, bratwurst, and packaged lunch meats. Liver and organ meats. Whole eggs and egg yolks. Chicken and turkey with skin. Fried meat.  Dairy  Whole or 2% milk, cream, half-and-half, and cream cheese. Whole milk cheeses. Whole-fat or sweetened yogurt. Full-fat cheeses. Nondairy creamers and whipped toppings. Processed cheese, cheese spreads, and cheese curds.  Beverages  Alcohol. Sugar-sweetened drinks such as sodas, lemonade, and fruit drinks.  Fats and oils  Butter, stick margarine, lard, shortening, ghee, or stephenson fat. Coconut, palm  kernel, and palm oils.  Sweets and desserts  Corn syrup, sugars, honey, and molasses. Candy. Jam and jelly. Syrup. Sweetened cereals. Cookies, pies, cakes, donuts, muffins, and ice cream.  The items listed above may not be a complete list of foods and beverages you should avoid. Contact a dietitian for more information.  Summary  Choosing the right foods helps keep your fat and cholesterol at normal levels. This can keep you from getting certain diseases.  At meals, fill one-half of your plate with vegetables and green salads.  Eat high-fiber foods, like whole grains, beans, apples, carrots, peas, and barley.  Limit added sugar, saturated fats, alcohol, and fried foods.  This information is not intended to replace advice given to you by your health care provider. Make sure you discuss any questions you have with your health care provider.  Document Revised: 04/21/2021 Document Reviewed: 04/21/2021  [employer] Patient Education © 2021 [employer].    Limit fried foods.  Cook foods without frying them. Baking, boiling, grilling, and broiling are all great options.  Lose weight if you are overweight. Losing even a small amount of weight can help your overall health. It can also help prevent diseases such as diabetes and heart disease.  WHAT FOODS CAN I EAT?  Grains  Whole grains, such as whole wheat or whole grain breads, crackers, cereals, and pasta. Unsweetened oatmeal, bulgur, barley, quinoa, or brown rice. Corn or whole wheat flour tortillas.  Vegetables  Fresh or frozen vegetables (raw, steamed, roasted, or grilled). Green salads.  Fruits  All fresh, canned (in natural juice), or frozen fruits.  Meat and Other Protein Products  Ground beef (85% or leaner), grass-fed beef, or beef trimmed of fat. Skinless chicken or turkey. Ground chicken or turkey. Pork trimmed of fat. All fish and seafood. Eggs. Dried beans, peas, or lentils. Unsalted nuts or seeds. Unsalted canned or dry beans.  Dairy  Low-fat dairy products,  such as skim or 1% milk, 2% or reduced-fat cheeses, low-fat ricotta or cottage cheese, or plain low-fat yogurt.  Fats and Oils  Tub margarines without trans fats. Light or reduced-fat mayonnaise and salad dressings. Avocado. Olive, canola, sesame, or safflower oils. Natural peanut or almond butter (choose ones without added sugar and oil).  The items listed above may not be a complete list of recommended foods or beverages. Contact your dietitian for more options.  WHAT FOODS ARE NOT RECOMMENDED?  Grains  White bread. White pasta. White rice. Cornbread. Bagels, pastries, and croissants. Crackers that contain trans fat.  Vegetables  White potatoes. Corn. Creamed or fried vegetables. Vegetables in a cheese sauce.  Fruits  Dried fruits. Canned fruit in light or heavy syrup. Fruit juice.  Meat and Other Protein Products  Fatty cuts of meat. Ribs, chicken wings, stephenson, sausage, bologna, salami, chitterlings, fatback, hot dogs, bratwurst, and packaged luncheon meats. Liver and organ meats.  Dairy  Whole or 2% milk, cream, half-and-half, and cream cheese. Whole milk cheeses. Whole-fat or sweetened yogurt. Full-fat cheeses. Nondairy creamers and whipped toppings. Processed cheese, cheese spreads, or cheese curds.  Sweets and Desserts  Corn syrup, sugars, honey, and molasses. Candy. Jam and jelly. Syrup. Sweetened cereals. Cookies, pies, cakes, donuts, muffins, and ice cream.  Fats and Oils  Butter, stick margarine, lard, shortening, ghee, or stephenson fat. Coconut, palm kernel, or palm oils.  Beverages  Alcohol. Sweetened drinks (such as sodas, lemonade, and fruit drinks or punches).  The items listed above may not be a complete list of foods and beverages to avoid. Contact your dietitian for more information.  Document Released: 06/18/2013 Document Revised: 08/21/2015 Document Reviewed: 03/19/2015  [employer]® Patient Information ©2015 [employer], [facility]. This information is not intended to replace advice given to you by your health  care provider. Make sure you discuss any questions you have with your health care provider. Cholesterol Content in Foods  Cholesterol is a waxy, fat-like substance that helps to carry fat in the blood. The body needs cholesterol in small amounts, but too much cholesterol can cause damage to the arteries and heart.  What foods have cholesterol?    Cholesterol is found in animal-based foods, such as meat, seafood, and dairy. Generally, low-fat dairy and lean meats have less cholesterol than full-fat dairy and fatty meats. The milligrams of cholesterol per serving (mg per serving) of common cholesterol-containing foods are listed below.  Meats and other proteins  Egg -- one large whole egg has 186 mg.  Veal shank -- 4 oz (113 g) has 141 mg.  Lean ground turkey (93% lean) -- 4 oz (113 g) has 118 mg.  Fat-trimmed lamb loin -- 4 oz (113 g) has 106 mg.  Lean ground beef (90% lean) -- 4 oz (113 g) has 100 mg.  Lobster -- 3.5 oz (99 g) has 90 mg.  Pork loin chops -- 4 oz (113 g) has 86 mg.  Canned salmon -- 3.5 oz (99 g) has 83 mg.  Fat-trimmed beef top loin -- 4 oz (113 g) has 78 mg.  Frankfurter -- 1 fina (3.5 oz or 99 g) has 77 mg.  Crab -- 3.5 oz (99 g) has 71 mg.  Roasted chicken without skin, white meat -- 4 oz (113 g) has 66 mg.  Light bologna -- 2 oz (57 g) has 45 mg.  Deli-cut turkey -- 2 oz (57 g) has 31 mg.  Canned tuna -- 3.5 oz (99 g) has 31 mg.  [place] -- 1 oz (28 g) has 29 mg.  Oysters and mussels (raw) -- 3.5 oz (99 g) has 25 mg.  Mackerel -- 1 oz (28 g) has 22 mg.  Trout -- 1 oz (28 g) has 20 mg.  Pork sausage -- 1 link (1 oz or 28 g) has 17 mg.  [place] -- 1 oz (28 g) has 16 mg.  Tilapia -- 1 oz (28 g) has 14 mg.  Dairy  Soft-serve ice cream -- ½ cup (4 oz or 86 g) has 103 mg.  Whole-milk yogurt -- 1 cup (8 oz or 245 g) has 29 mg.  Cheddar cheese -- 1 oz (28 g) has 28 mg.  American cheese -- 1 oz (28 g) has 28 mg.  Whole milk -- 1 cup (8 oz or 250 mL) has 23 mg.  2% milk -- 1 cup (8 oz or 250 mL) has 18  mg.  Cream cheese -- 1 tablespoon (Tbsp) (14.5 g) has 15 mg.  Cottage cheese -- ½ cup (4 oz or 113 g) has 14 mg.  Low-fat (1%) milk -- 1 cup (8 oz or 250 mL) has 10 mg.  Sour cream -- 1 Tbsp (12 g) has 8.5 mg.  Low-fat yogurt -- 1 cup (8 oz or 245 g) has 8 mg.  Nonfat Greek yogurt -- 1 cup (8 oz or 228 g) has 7 mg.  Half-and-half cream -- 1 Tbsp (15 mL) has 5 mg.  Fats and oils  Cod liver oil -- 1 tablespoon (Tbsp) (13.6 g) has 82 mg.  Butter -- 1 Tbsp (14 g) has 15 mg.  Lard -- 1 Tbsp (12.8 g) has 14 mg.  [place]unningham grease -- 1 Tbsp (12.9 g) has 14 mg.  Mayonnaise -- 1 Tbsp (13.8 g) has 5-10 mg.  Margarine -- 1 Tbsp (14 g) has 3-10 mg.  The items listed above may not be a complete list of foods with cholesterol. Exact amounts of cholesterol in these foods may vary depending on specific ingredients and brands. Contact a dietitian for more information.  What foods do not have cholesterol?  Most plant-based foods do not have cholesterol unless you combine them with a food that has cholesterol. Foods without cholesterol include:  Grains and cereals.  Vegetables.  Fruits.  Vegetable oils, such as olive, canola, and sunflower oil.  Legumes, such as peas, beans, and lentils.  Nuts and seeds.  Egg whites.  The items listed above may not be a complete list of foods that do not have cholesterol. Contact a dietitian for more information.  Summary  The body needs cholesterol in small amounts, but too much cholesterol can cause damage to the arteries and heart.  Cholesterol is found in animal-based foods, such as meat, seafood, and dairy. Generally, low-fat dairy and lean meats have less cholesterol than full-fat dairy and fatty meats.  This information is not intended to replace advice given to you by your health care provider. Make sure you discuss any questions you have with your health care provider.  Document Revised: 04/29/2022 Document Reviewed: 04/29/2022  Elsevier Patient Education © 2024 Elsevier Inc.

## 2024-11-20 ENCOUNTER — DOCUMENTATION (OUTPATIENT)
Dept: PHYSICAL THERAPY | Facility: CLINIC | Age: 49
End: 2024-11-20
Payer: COMMERCIAL

## 2024-11-20 NOTE — PROGRESS NOTES
Physical Therapy Discharge Summary  [address] [address]    Patient: Dg Harden   : 1975  Diagnosis/ICD-10 Code:  Acute midline low back pain without sciatica [M54.50]  Referring practitioner: Khoi Man MD  Date of Initial Visit: Type: THERAPY  Noted: 2024  Last Visit Date: 2024  Patient seen for 4 sessions    Discharge Status of Patient: pt canceled his last scheduled appt, but was going to try I HEP after that visit. Pt never returned to PT.      Goals: Goal status is undetermined as pt never returned to PT after the 4th visit.      Discharge Plan: Continue with current home exercise program as instructed     Comments: Pt was given HEP during sessions.      Date of Discharge: 24            Shena Mcdonald, PT  Physical Therapist  [place] License: [id number]

## 2025-03-01 DIAGNOSIS — E78.49 OTHER HYPERLIPIDEMIA: ICD-10-CM

## 2025-03-01 DIAGNOSIS — I25.10 ATHEROSCLEROSIS OF NATIVE CORONARY ARTERY OF NATIVE HEART WITHOUT ANGINA PECTORIS: ICD-10-CM

## 2025-03-03 RX ORDER — ROSUVASTATIN CALCIUM 20 MG/1
20 TABLET, COATED ORAL NIGHTLY
Qty: 90 TABLET | Refills: 0 | Status: SHIPPED | OUTPATIENT
Start: 2025-03-03

## 2025-03-26 ENCOUNTER — OFFICE VISIT (OUTPATIENT)
Dept: FAMILY MEDICINE CLINIC | Facility: CLINIC | Age: 50
End: 2025-03-26
Payer: COMMERCIAL

## 2025-03-26 VITALS
WEIGHT: 203 LBS | HEART RATE: 67 BPM | HEIGHT: 72 IN | DIASTOLIC BLOOD PRESSURE: 76 MMHG | SYSTOLIC BLOOD PRESSURE: 120 MMHG | BODY MASS INDEX: 27.5 KG/M2 | OXYGEN SATURATION: 100 %

## 2025-03-26 DIAGNOSIS — Z23 IMMUNIZATION DUE: ICD-10-CM

## 2025-03-26 DIAGNOSIS — I25.10 ATHEROSCLEROSIS OF NATIVE CORONARY ARTERY OF NATIVE HEART WITHOUT ANGINA PECTORIS: ICD-10-CM

## 2025-03-26 DIAGNOSIS — I10 ESSENTIAL HYPERTENSION: ICD-10-CM

## 2025-03-26 DIAGNOSIS — R74.8 ELEVATED CPK: ICD-10-CM

## 2025-03-26 DIAGNOSIS — E78.49 OTHER HYPERLIPIDEMIA: ICD-10-CM

## 2025-03-26 DIAGNOSIS — Z00.00 ENCOUNTER FOR WELLNESS EXAMINATION IN ADULT: Primary | ICD-10-CM

## 2025-03-26 DIAGNOSIS — K21.9 GASTROESOPHAGEAL REFLUX DISEASE WITHOUT ESOPHAGITIS: ICD-10-CM

## 2025-03-26 PROBLEM — M71.21 BAKER'S CYST OF KNEE, RIGHT: Status: RESOLVED | Noted: 2021-02-01 | Resolved: 2025-03-26

## 2025-03-26 PROCEDURE — 99396 PREV VISIT EST AGE 40-64: CPT | Performed by: FAMILY MEDICINE

## 2025-03-26 PROCEDURE — 90750 HZV VACC RECOMBINANT IM: CPT | Performed by: FAMILY MEDICINE

## 2025-03-26 RX ORDER — ROSUVASTATIN CALCIUM 20 MG/1
20 TABLET, COATED ORAL NIGHTLY
Qty: 90 TABLET | Refills: 3 | Status: CANCELLED | OUTPATIENT
Start: 2025-03-26

## 2025-03-26 RX ORDER — OMEPRAZOLE 40 MG/1
40 CAPSULE, DELAYED RELEASE ORAL
Qty: 90 CAPSULE | Refills: 2 | Status: SHIPPED | OUTPATIENT
Start: 2025-03-26 | End: 2025-12-21

## 2025-03-26 RX ORDER — EVOLOCUMAB 140 MG/ML
140 INJECTION, SOLUTION SUBCUTANEOUS
Qty: 6 ML | Refills: 2 | Status: SHIPPED | OUTPATIENT
Start: 2025-03-26 | End: 2025-12-21

## 2025-03-26 RX ORDER — LISINOPRIL 30 MG/1
30 TABLET ORAL NIGHTLY
Qty: 90 TABLET | Refills: 2 | Status: SHIPPED | OUTPATIENT
Start: 2025-03-26 | End: 2025-12-21

## 2025-03-26 NOTE — ASSESSMENT & PLAN NOTE
Condition is stable. The current medical regimen is effective;  continue present plan and medications.  LDL goal less than 55  Orders:    Evolocumab (Repatha SureClick) solution auto-injector SureClick injection; Inject 1 mL under the skin into the appropriate area as directed Every 14 (Fourteen) Days for 270 days.    Lipid Panel With / Chol / HDL Ratio; Future

## 2025-03-26 NOTE — ASSESSMENT & PLAN NOTE
Essential hypertension is controlled.  Continue same therapy  Orders:    lisinopril (PRINIVIL,ZESTRIL) 30 MG tablet; Take 1 tablet by mouth Every Night for 270 days.    Comprehensive Metabolic Panel; Future    CBC & Differential; Future

## 2025-03-26 NOTE — PROGRESS NOTES
Chief Complaint  Physical    Subjective        HPI      The patient is a 49-year-old male who presents today for an annual wellness physical, medication checkup, and to refill medications.    He reports no side effects from his current lisinopril regimen, including dizziness, cough, or congestion. He is on lisinopril.    His gastrointestinal symptoms are well-managed with omeprazole, which he typically administers in the morning. He rarely experiences breakthrough heartburn and notes a significant difference when he omits the medication. He is on omeprazole.    He has not been experiencing chest pain or shortness of breath. He acknowledges a decrease in his running frequency due to scheduling conflicts. He has been experiencing leg pain, which has been a recurring issue during his last few lab visits. He reports no strenuous exercise for at least a week prior to this visit. He also reports intermittent severe quad pain, which has disrupted his sleep. He is on rosuvastatin.    He maintains a regular eye exam schedule every 1 to 2 years and admits to being slightly behind on his dental exams. He attempts to exercise 2 to 3 days per week, primarily running. He reports satisfactory sleep quality and does not frequently wake up to urinate. He describes his urine stream as strong and reports no dribbling or difficulty with emptying or initiating urination. He has had knee surgery and a vasectomy but no other surgical procedures. He has not received the updated COVID-19 vaccine in a couple of years. He is on over-the-counter fish oil, multivitamin, aspirin, and calcium supplements.    Supplemental Information  He has a history of Baker's cyst in the knee, which was addressed by orthopedics a few years ago and has not caused any recent discomfort. He also has a history of Schatzki's ring, which occasionally requires dilation, but he reports no current issues with dysphagia or food impaction.    SOCIAL HISTORY  - Does not  "smoke  - Consumes alcohol approximately five times a week  - Has 2 daughters aged 15 and 12  - Works at [employer]    FAMILY HISTORY  - Parents are both still living  - One brother who is healthy    MEDICATIONS  - Lisinopril  - Omeprazole  - Rosuvastatin  - Fish oil  - Multivitamin  - Aspirin  - Calcium           Vital Signs:   Vitals:    03/26/25 1256   BP: 120/76   Pulse: 67   SpO2: 100%   Weight: 92.1 kg (203 lb)   Height: 182.9 cm (72\")            3/26/2025    12:59 PM   PHQ-2/PHQ-9 Depression Screening   Little interest or pleasure in doing things Not at all   Feeling down, depressed, or hopeless Not at all                 Physical Exam  Constitutional:       Appearance: Normal appearance. He is well-developed.   HENT:      Head: Normocephalic.      Right Ear: Hearing, tympanic membrane and external ear normal.      Left Ear: Hearing, tympanic membrane and external ear normal.   Eyes:      General: Lids are normal.      Conjunctiva/sclera: Conjunctivae normal.      Pupils: Pupils are equal, round, and reactive to light.      Funduscopic exam:     Right eye: No AV nicking or papilledema.         Left eye: No AV nicking or papilledema.   Neck:      Thyroid: No thyroid mass or thyromegaly.      Vascular: No carotid bruit or JVD.      Trachea: Trachea normal.   Cardiovascular:      Rate and Rhythm: Normal rate and regular rhythm.      Pulses: Normal pulses.      Heart sounds: Normal heart sounds. No murmur heard.  Pulmonary:      Effort: Pulmonary effort is normal.      Breath sounds: Normal breath sounds.   Abdominal:      General: Bowel sounds are normal.      Palpations: Abdomen is soft.      Tenderness: There is no abdominal tenderness.   Musculoskeletal:         General: Normal range of motion.      Cervical back: Normal range of motion and neck supple.      Lumbar back: No bony tenderness.   Lymphadenopathy:      Cervical: No cervical adenopathy.      Upper Body:      Right upper body: No " supraclavicular adenopathy.      Left upper body: No supraclavicular adenopathy.   Skin:     General: Skin is warm and dry.      Findings: No rash.      Nails: There is no clubbing.   Neurological:      Mental Status: He is alert and oriented to person, place, and time.      Cranial Nerves: No cranial nerve deficit.      Deep Tendon Reflexes:      Reflex Scores:       Patellar reflexes are 2+ on the right side and 2+ on the left side.  Psychiatric:         Speech: Speech normal.         Behavior: Behavior normal.         Thought Content: Thought content normal.         Judgment: Judgment normal.            The following data was reviewed by: Khoi Man MD on 03/26/2025:      Results  - Laboratory Studies:    - Creatinine kinase: 216    - AST: 35    - ALT: 40    - Total cholesterol: 169    - HDL: 84    - LDL: 68    - White blood cell count: 4.37 (normal)    - Hemoglobin: 15    - Monocyte percent: 12.6%                Assessment and Plan          Encounter for wellness examination in adult  Immunizations discussed, diet along with exercise.        Essential hypertension    Essential hypertension is controlled.  Continue same therapy  Orders:    lisinopril (PRINIVIL,ZESTRIL) 30 MG tablet; Take 1 tablet by mouth Every Night for 270 days.    Comprehensive Metabolic Panel; Future    CBC & Differential; Future    Gastroesophageal reflux disease without esophagitis  GERD symptoms are controlled nicely with current proton pump inhibitor.  He will continue to take that medication in the morning.  No evidence or symptoms of dysphagia or food sticking.  He does have a remote history of Schatzki's ring which is totally asymptomatic  Orders:    omeprazole (priLOSEC) 40 MG capsule; Take 1 capsule by mouth Every Morning Before Breakfast for 270 days.    Other hyperlipidemia     LDL cholesterol not quite to goal.  Elevation of CPK once again noted on labs.  We will switch to Repatha SureClick.  Discontinue of statin therapy.   Follow-up in 6 months with repeat labs.  Copy of Mediterranean diet information shared in the after visit summary.  Orders:    Evolocumab (Repatha SureClick) solution auto-injector SureClick injection; Inject 1 mL under the skin into the appropriate area as directed Every 14 (Fourteen) Days for 270 days.    CK; Future    Lipid Panel With / Chol / HDL Ratio; Future    Atherosclerosis of native coronary artery of native heart without angina pectoris    Condition is stable. The current medical regimen is effective;  continue present plan and medications.  LDL goal less than 55  Orders:    Evolocumab (Repatha SureClick) solution auto-injector SureClick injection; Inject 1 mL under the skin into the appropriate area as directed Every 14 (Fourteen) Days for 270 days.    Lipid Panel With / Chol / HDL Ratio; Future    Elevated CPK  See above discussion regarding CPK.  Patient is having some muscle issues.  We will discontinue statin and go to Repatha.  Orders:    Evolocumab (Repatha SureClick) solution auto-injector SureClick injection; Inject 1 mL under the skin into the appropriate area as directed Every 14 (Fourteen) Days for 270 days.    CK; Future    Immunization due  Needs, prevnar and covid as well.   Orders:    Shingrix Vaccine       Return in about 6 months (around 9/26/2025) for recheck/refill medication.     Patient was given instructions and counseling regarding his condition or for health maintenance advice. Please see specific information pulled into the AVS if appropriate.     Patient or patient representative verbalized consent for the use of Ambient Listening during the visit with  Khoi Man MD for chart documentation. 3/26/2025  13:28 EDT

## 2025-03-26 NOTE — PATIENT INSTRUCTIONS
Prevnar 20 Vaccine is recommended for all patients over the age of 50 to help prevent bacterial pneumonia. A repeat dose will be recommended at age 65. Higher risk patients may need this vaccine before age 50(ex., if you smoke, or have asthma or other lung disease).  Check on insurance if you are under age 65 to make sure this is covered. You can get this vaccine at our office or at your local pharmacy.        Mediterranean Diet  A Mediterranean diet is based on the traditions of countries on the [place]. It focuses on eating more:  Fruits and vegetables.  Whole grains, beans, nuts, and seeds.  Heart-healthy fats. These are fats that are good for your heart.  It involves eating less:  Dairy.  Meat and eggs.  Processed foods with added sugar, salt, and fat.  This type of diet can help prevent certain conditions. It can also improve outcomes if you have a long-term (chronic) disease, such as kidney or heart disease.  What are tips for following this plan?  Reading food labels  Check packaged foods for:  The serving size. For foods such as rice and pasta, the serving size is the amount of cooked product, not dry.  The total fat. Avoid foods with saturated fat or trans fat.  Added sugars, such as corn syrup.  Shopping    Try to have a balanced diet. Buy a variety of foods, such as:  Fresh fruits and vegetables. You may be able to get these from local farmers markets. You can also buy them frozen.  Grains, beans, nuts, and seeds. Some of these can be bought in bulk.  Fresh seafood.  Poultry and eggs.  Low-fat dairy products.  Buy whole ingredients instead of foods that have already been packaged.  If you can't get fresh seafood, buy precooked frozen shrimp or canned fish, such as tuna, salmon, or sardines.  Stock your pantry so you always have certain foods on hand, such as olive oil, canned tuna, canned tomatoes, rice, pasta, and beans.  Cooking  Cook foods with extra-virgin olive oil instead of using butter  or other vegetable oils.  Have meat as a side dish. Have vegetables or grains as your main dish. This means having meat in small portions or adding small amounts of meat to foods like pasta or stew.  Use beans or vegetables instead of meat in common dishes like chili or lasagna.  Try out different cooking methods. Try roasting, broiling, steaming, and sautéing vegetables.  Add frozen vegetables to soups, stews, pasta, or rice.  Add nuts or seeds for added healthy fats and plant protein at each meal. You can add these to yogurt, salads, or vegetable dishes.  Marinate fish or vegetables using olive oil, lemon juice, garlic, and fresh herbs.  Meal planning  Plan to eat a vegetarian meal one day each week. Try to work up to two vegetarian meals, if possible.  Eat seafood two or more times a week.  Have healthy snacks on hand. These may include:  Vegetable sticks with hummus.  Greek yogurt.  Fruit and nut trail mix.  Eat balanced meals. These should include:  Fruit: 2-3 servings a day.  Vegetables: 4-5 servings a day.  Low-fat dairy: 2 servings a day.  Fish, poultry, or lean meat: 1 serving a day.  Beans and legumes: 2 or more servings a week.  Nuts and seeds: 1-2 servings a day.  Whole grains: 6-8 servings a day.  Extra-virgin olive oil: 3-4 servings a day.  Limit red meat and sweets to just a few servings a month.  Lifestyle    Try to cook and eat meals with your family.  Drink enough fluid to keep your pee (urine) pale yellow.  Be active every day. This includes:  Aerobic exercise, which is exercise that causes your heart to beat faster. Examples include running and swimming.  Leisure activities like gardening, walking, or housework.  Get 7-8 hours of sleep each night.  Drink red wine if your provider says you can. A glass of wine is 5 oz (150 mL). You may be allowed to have:  Up to 1 glass a day if you're female and not pregnant.  Up to 2 glasses a day if you're male.  What foods should I eat?  Fruits  Apples.  Apricots. Avocado. Berries. Bananas. Cherries. Dates. Figs. Grapes. Lizz. Melon. Oranges. Peaches. Plums. Pomegranate.  Vegetables  Artichokes. Beets. Broccoli. Cabbage. Carrots. Eggplant. Green beans. Chard. Kale. Spinach. Onions. Leeks. Peas. Squash. Tomatoes. Peppers. Radishes.  Grains  Whole-grain pasta. Brown rice. Bulgur wheat. Polenta. Couscous. Whole-wheat bread. Oatmeal. Quinoa.  Meats and other proteins  Beans. Almonds. Sunflower seeds. Pine nuts. Peanuts. Cod. [place]. Scallops. Shrimp. Tuna. Tilapia. Clams. Oysters. Eggs. Chicken or turkey without skin.  Dairy  Low-fat milk. Cheese. Greek yogurt.  Fats and oils  Extra-virgin olive oil. Avocado oil. Grapeseed oil.  Beverages  Water. Red wine. Herbal tea.  Sweets and desserts  Greek yogurt with honey. Baked apples. Poached pears. Trail mix.  Seasonings and condiments  Bas[place]. Cilantro. Coriander. Cumin. Mint. Parsley. [place]. Rosemary. Tarragon. Garlic. Oregano. Thyme. Pepper. Balsamic vinegar. Tahini. Hummus. Tomato sauce. Olives. Mushrooms.  The items listed above may not be all the foods and drinks you can have. Talk to a dietitian to learn more.  What foods should I limit?  This is a list of foods that should be eaten rarely.  Fruits  Fruit canned in syrup.  Vegetables  Deep-fried potatoes, like French fries.  Grains  Packaged pasta or rice dishes. Cereal with added sugar. Snacks with added sugar.  Meats and other proteins  Beef. Pork. Lamb. Chicken or turkey with skin. Hot dogs. [place].  Dairy  Ice cream. Sour cream. Whole milk.  Fats and oils  Butter. Canola oil. Vegetable oil. Beef fat (tallow). Lard.  Beverages  Juice. Sugar-sweetened soft drinks. Beer. Liquor and spirits.  Sweets and desserts  Cookies. Cakes. Pies. Candy.  Seasonings and condiments  [facility]. Pre-made sauces and marinades.  The items listed above may not be all the foods and drinks you should limit. Talk to a dietitian to learn more.  Where to find more information  American Heart  Association (AHA): heart.org  This information is not intended to replace advice given to you by your health care provider. Make sure you discuss any questions you have with your health care provider.  Document Revised: 03/31/2024 Document Reviewed: 03/31/2024  Elsevier Patient Education © 2024 [employer].

## 2025-03-26 NOTE — ASSESSMENT & PLAN NOTE
LDL cholesterol not quite to goal.  Elevation of CPK once again noted on labs.  We will switch to Repatha SureClick.  Discontinue of statin therapy.  Follow-up in 6 months with repeat labs.  Copy of Mediterranean diet information shared in the after visit summary.  Orders:    Evolocumab (Repatha SureClick) solution auto-injector SureClick injection; Inject 1 mL under the skin into the appropriate area as directed Every 14 (Fourteen) Days for 270 days.    CK; Future    Lipid Panel With / Chol / HDL Ratio; Future

## 2025-03-26 NOTE — ASSESSMENT & PLAN NOTE
GERD symptoms are controlled nicely with current proton pump inhibitor.  He will continue to take that medication in the morning.  No evidence or symptoms of dysphagia or food sticking.  He does have a remote history of Schatzki's ring which is totally asymptomatic  Orders:    omeprazole (priLOSEC) 40 MG capsule; Take 1 capsule by mouth Every Morning Before Breakfast for 270 days.

## 2025-04-03 DIAGNOSIS — E78.49 OTHER HYPERLIPIDEMIA: ICD-10-CM

## 2025-04-03 DIAGNOSIS — I25.10 ATHEROSCLEROSIS OF NATIVE CORONARY ARTERY OF NATIVE HEART WITHOUT ANGINA PECTORIS: ICD-10-CM

## 2025-04-03 DIAGNOSIS — R74.8 ELEVATED CPK: ICD-10-CM

## 2025-04-03 RX ORDER — EVOLOCUMAB 140 MG/ML
140 INJECTION, SOLUTION SUBCUTANEOUS
Qty: 6 ML | Refills: 2 | Status: SHIPPED | OUTPATIENT
Start: 2025-04-03 | End: 2025-12-29

## 2025-04-03 NOTE — TELEPHONE ENCOUNTER
"Caller: Dg Harden \"Slava\"    Relationship: Self    Best call back number: [phone]     Requested Prescriptions:   Requested Prescriptions     Pending Prescriptions Disp Refills    Evolocumab (Repatha SureClick) solution auto-injector SureClick injection 6 mL 2     Sig: Inject 1 mL under the skin into the appropriate area as directed Every 14 (Fourteen) Days for 270 days.        Pharmacy where request should be sent: [id number] [address] [place]  [phone] [facility] [phone]      Last office visit with prescribing clinician: 3/26/2025   Last telemedicine visit with prescribing clinician: Visit date not found   Next office visit with prescribing clinician: 9/25/2025     Additional details provided by patient: PATIENT STATES THAT HIS MAIL ORDER PHARMACY IS REQUIRING HIM TO HAVE THIS FILLED LOCALLY.     PLEASE SEND TO [facility].     Does the patient have less than a 3 day supply:  [x] Yes  [] No    Would you like a call back once the refill request has been completed: [] Yes [x] No    If the office needs to give you a call back, can they leave a voicemail: [] Yes [x] No    Armaan Starkey   04/03/25 09:21 EDT   "

## 2025-05-30 DIAGNOSIS — I25.10 ATHEROSCLEROSIS OF NATIVE CORONARY ARTERY OF NATIVE HEART WITHOUT ANGINA PECTORIS: ICD-10-CM

## 2025-05-30 DIAGNOSIS — E78.49 OTHER HYPERLIPIDEMIA: ICD-10-CM

## 2025-05-30 RX ORDER — ROSUVASTATIN CALCIUM 20 MG/1
20 TABLET, COATED ORAL NIGHTLY
Qty: 90 TABLET | Refills: 0 | Status: SHIPPED | OUTPATIENT
Start: 2025-05-30

## (undated) DEVICE — FRCP BX RADJAW4 NDL 2.8 240CM LG OG BX40

## (undated) DEVICE — KT ORCA ORCAPOD DISP STRL

## (undated) DEVICE — SENSR O2 OXIMAX FNGR A/ 18IN NONSTR

## (undated) DEVICE — BITEBLOCK OMNI BLOC

## (undated) DEVICE — ADAPT CLN BIOGUARD AIR/H2O DISP

## (undated) DEVICE — CANN O2 ETCO2 FITS ALL CONN CO2 SMPL A/ 7IN DISP LF

## (undated) DEVICE — TUBING, SUCTION, 1/4" X 10', STRAIGHT: Brand: MEDLINE

## (undated) DEVICE — THE TORRENT IRRIGATION SCOPE CONNECTOR IS USED WITH THE TORRENT IRRIGATION TUBING TO PROVIDE IRRIGATION FLUIDS SUCH AS STERILE WATER DURING GASTROINTESTINAL ENDOSCOPIC PROCEDURES WHEN USED IN CONJUNCTION WITH AN IRRIGATION PUMP (OR ELECTROSURGICAL UNIT).: Brand: TORRENT

## (undated) DEVICE — LN SMPL CO2 SHTRM SD STREAM W/M LUER